# Patient Record
Sex: FEMALE | Race: WHITE | Employment: FULL TIME | URBAN - NONMETROPOLITAN AREA
[De-identification: names, ages, dates, MRNs, and addresses within clinical notes are randomized per-mention and may not be internally consistent; named-entity substitution may affect disease eponyms.]

---

## 2017-10-18 ENCOUNTER — HOSPITAL ENCOUNTER (OUTPATIENT)
Age: 32
Setting detail: SPECIMEN
Discharge: HOME | End: 2017-10-18
Payer: COMMERCIAL

## 2017-10-18 ENCOUNTER — OFFICE VISIT (OUTPATIENT)
Dept: OBGYN | Age: 32
End: 2017-10-18
Payer: COMMERCIAL

## 2017-10-18 VITALS
DIASTOLIC BLOOD PRESSURE: 64 MMHG | WEIGHT: 126 LBS | HEIGHT: 63 IN | BODY MASS INDEX: 22.32 KG/M2 | RESPIRATION RATE: 16 BRPM | HEART RATE: 76 BPM | SYSTOLIC BLOOD PRESSURE: 122 MMHG

## 2017-10-18 DIAGNOSIS — N94.10 DYSPAREUNIA IN FEMALE: ICD-10-CM

## 2017-10-18 DIAGNOSIS — Z01.419 WOMEN'S ANNUAL ROUTINE GYNECOLOGICAL EXAMINATION: ICD-10-CM

## 2017-10-18 DIAGNOSIS — N92.6 IRREGULAR BLEEDING: Primary | ICD-10-CM

## 2017-10-18 DIAGNOSIS — G44.029 CHRONIC CLUSTER HEADACHE, NOT INTRACTABLE: ICD-10-CM

## 2017-10-18 DIAGNOSIS — M54.89 BACK PAIN WITHOUT SCIATICA: ICD-10-CM

## 2017-10-18 DIAGNOSIS — M26.623 BILATERAL TEMPOROMANDIBULAR JOINT PAIN: ICD-10-CM

## 2017-10-18 DIAGNOSIS — N92.6 IRREGULAR BLEEDING: ICD-10-CM

## 2017-10-18 PROCEDURE — 87491 CHLMYD TRACH DNA AMP PROBE: CPT

## 2017-10-18 PROCEDURE — 1036F TOBACCO NON-USER: CPT | Performed by: OBSTETRICS & GYNECOLOGY

## 2017-10-18 PROCEDURE — G8484 FLU IMMUNIZE NO ADMIN: HCPCS | Performed by: OBSTETRICS & GYNECOLOGY

## 2017-10-18 PROCEDURE — G8427 DOCREV CUR MEDS BY ELIG CLIN: HCPCS | Performed by: OBSTETRICS & GYNECOLOGY

## 2017-10-18 PROCEDURE — 88175 CYTOPATH C/V AUTO FLUID REDO: CPT

## 2017-10-18 PROCEDURE — 99204 OFFICE O/P NEW MOD 45 MIN: CPT | Performed by: OBSTETRICS & GYNECOLOGY

## 2017-10-18 PROCEDURE — G8420 CALC BMI NORM PARAMETERS: HCPCS | Performed by: OBSTETRICS & GYNECOLOGY

## 2017-10-18 PROCEDURE — 87591 N.GONORRHOEAE DNA AMP PROB: CPT

## 2017-10-18 NOTE — PROGRESS NOTES
Subjective:   ANXIETY  C/O DAILY HEADACHES HA TAKING EXCEDRIN DAILY  OCCASIONAL INTRACTABLE WITH NAUSEA AND VOMITING    WORRIED THAT HER PROLACTINOMA HAS COME BACK  NO VISUAL CHANGES    CRIED AS SHE TELL HOW SHE LOST HER JOB ---  C/O BACK PAIN  DESPITE SEEING A CHIROPRACTOR    HERE TO SEE ME FOR CONSTANT BLEEDING FOR > 30 DAYS  LIGHT TO HEAVY WITH NO CLOTS   SEVERE CRAMPS AND PAIN    NOT PLEASED WITH INTERCOURSE  PAIN FULL SHE ONLY TRIES TO TOLERATE THE PAIN WITH INCREASED WITH DEEP PENTRATION       vIy Saucedo is a 34 y.o. female who presents for an annual exam. The patient has no complaints today. The patient is sexually active. Wears seatbelts: yes last pap: was normal  Regular exercise: not asked  Ever been transfused or tattooed?: not asked  The patient reports that domestic violence in her life is absent. Menstrual History:  OB History      Para Term  AB Living    5 2 2   3 2    SAB TAB Ectopic Molar Multiple Live Births                        Menarche age: 14  Patient's last menstrual period was 2017.        Past Medical History:   Diagnosis Date    Anxiety     Breast pain in female 2014    Pituitary adenoma ([facility] [address]     follows with Dr. Kuldeep Mcduffie     Patient Active Problem List    Diagnosis Date Noted    Breast pain in female 2014     Priority: Low     Class: Acute    Anxiety      Past Surgical History:   Procedure Laterality Date     SECTION  2009     SECTION  2011    CHOLECYSTECTOMY  2010    WISDOM TOOTH EXTRACTION  2007     Family History   Problem Relation Age of Onset    Cancer Mother      cervical    Heart Disease Father     Cancer Maternal Grandfather      lung     Social History     Social History    Marital status:      Spouse name: N/A    Number of children: N/A    Years of education: N/A     Social History Main Topics    Smoking status: Never Smoker    Smokeless tobacco: Never Used    Alcohol use 0.0 oz/week

## 2017-10-20 LAB
CHLAMYDIA BY THIN PREP: NEGATIVE
N. GONORRHOEAE DNA, THIN PREP: NEGATIVE

## 2017-10-26 ENCOUNTER — HOSPITAL ENCOUNTER (OUTPATIENT)
Dept: ULTRASOUND IMAGING | Age: 32
Discharge: HOME | End: 2017-10-26
Payer: COMMERCIAL

## 2017-10-26 ENCOUNTER — HOSPITAL ENCOUNTER (OUTPATIENT)
Dept: LAB | Age: 32
Setting detail: SPECIMEN
Discharge: HOME | End: 2017-10-26
Payer: COMMERCIAL

## 2017-10-26 DIAGNOSIS — N94.10 DYSPAREUNIA IN FEMALE: ICD-10-CM

## 2017-10-26 DIAGNOSIS — N92.6 IRREGULAR BLEEDING: ICD-10-CM

## 2017-10-26 LAB
ABSOLUTE EOS #: 0.1 K/UL (ref 0–0.4)
ABSOLUTE IMMATURE GRANULOCYTE: NORMAL K/UL (ref 0–0.3)
ABSOLUTE LYMPH #: 2.5 K/UL (ref 1–4.8)
ABSOLUTE MONO #: 0.6 K/UL (ref 0.1–1.2)
BASOPHILS # BLD: 1 % (ref 0–1)
BASOPHILS ABSOLUTE: 0 K/UL (ref 0–0.2)
C-REACTIVE PROTEIN: 2.4 MG/L (ref 0–5)
CYTOLOGY REPORT: NORMAL
DIFFERENTIAL TYPE: NORMAL
EOSINOPHILS RELATIVE PERCENT: 1 % (ref 1–7)
HCT VFR BLD CALC: 39.5 % (ref 36–46)
HEMOGLOBIN: 13.9 G/DL (ref 12–16)
IMMATURE GRANULOCYTES: NORMAL %
LYMPHOCYTES # BLD: 25 % (ref 16–46)
MCH RBC QN AUTO: 32 PG (ref 26–34)
MCHC RBC AUTO-ENTMCNC: 35.1 G/DL (ref 31–37)
MCV RBC AUTO: 91.3 FL (ref 80–100)
MONOCYTES # BLD: 6 % (ref 4–11)
PDW BLD-RTO: 12.6 % (ref 11–14.5)
PLATELET # BLD: 243 K/UL (ref 140–450)
PLATELET ESTIMATE: NORMAL
PMV BLD AUTO: 8.6 FL (ref 6–12)
RBC # BLD: 4.33 M/UL (ref 4–5.2)
RBC # BLD: NORMAL 10*6/UL
SEG NEUTROPHILS: 67 % (ref 43–77)
SEGMENTED NEUTROPHILS ABSOLUTE COUNT: 7 K/UL (ref 1.8–7.7)
TSH SERPL DL<=0.05 MIU/L-ACNC: 3.22 MIU/L (ref 0.3–5)
WBC # BLD: 10.4 K/UL (ref 3.5–11)
WBC # BLD: NORMAL 10*3/UL

## 2017-10-26 PROCEDURE — 86140 C-REACTIVE PROTEIN: CPT

## 2017-10-26 PROCEDURE — 84443 ASSAY THYROID STIM HORMONE: CPT

## 2017-10-26 PROCEDURE — 85025 COMPLETE CBC W/AUTO DIFF WBC: CPT

## 2017-10-26 PROCEDURE — 36415 COLL VENOUS BLD VENIPUNCTURE: CPT

## 2017-10-26 PROCEDURE — 76830 TRANSVAGINAL US NON-OB: CPT

## 2017-10-26 PROCEDURE — 76856 US EXAM PELVIC COMPLETE: CPT

## 2017-12-12 ENCOUNTER — NURSE ONLY (OUTPATIENT)
Dept: LAB | Age: 32
End: 2017-12-12
Payer: COMMERCIAL

## 2017-12-12 DIAGNOSIS — Z23 NEED FOR VACCINATION: Primary | ICD-10-CM

## 2017-12-12 PROCEDURE — 90686 IIV4 VACC NO PRSV 0.5 ML IM: CPT | Performed by: NURSE PRACTITIONER

## 2017-12-12 PROCEDURE — 90471 IMMUNIZATION ADMIN: CPT | Performed by: NURSE PRACTITIONER

## 2017-12-12 NOTE — PROGRESS NOTES
Have you had an allergic reaction to the flu (influenza) shot? no  Are you allergic to eggs or any component of the flu vaccine? no  Do you have a history of Guillain-Leonard Syndrome (GBS), which is paralysis after receiving the flu vaccine? no  Are you feeling well today? yes  Flu vaccine given as ordered. Patient tolerated it well. No questions re: VIS information.

## 2018-09-18 ENCOUNTER — OFFICE VISIT (OUTPATIENT)
Dept: FAMILY MEDICINE CLINIC | Age: 33
End: 2018-09-18
Payer: COMMERCIAL

## 2018-09-18 VITALS
BODY MASS INDEX: 21.62 KG/M2 | HEART RATE: 54 BPM | SYSTOLIC BLOOD PRESSURE: 100 MMHG | HEIGHT: 63 IN | OXYGEN SATURATION: 99 % | DIASTOLIC BLOOD PRESSURE: 62 MMHG | WEIGHT: 122 LBS

## 2018-09-18 DIAGNOSIS — F32.A DEPRESSION, UNSPECIFIED DEPRESSION TYPE: ICD-10-CM

## 2018-09-18 DIAGNOSIS — G47.00 INSOMNIA, UNSPECIFIED TYPE: ICD-10-CM

## 2018-09-18 DIAGNOSIS — F41.9 ANXIETY: Primary | ICD-10-CM

## 2018-09-18 PROCEDURE — 99214 OFFICE O/P EST MOD 30 MIN: CPT | Performed by: NURSE PRACTITIONER

## 2018-09-18 PROCEDURE — G8420 CALC BMI NORM PARAMETERS: HCPCS | Performed by: NURSE PRACTITIONER

## 2018-09-18 PROCEDURE — G8427 DOCREV CUR MEDS BY ELIG CLIN: HCPCS | Performed by: NURSE PRACTITIONER

## 2018-09-18 PROCEDURE — 1036F TOBACCO NON-USER: CPT | Performed by: NURSE PRACTITIONER

## 2018-09-18 RX ORDER — PAROXETINE HYDROCHLORIDE 20 MG/1
TABLET, FILM COATED ORAL
Qty: 30 TABLET | Refills: 1 | Status: SHIPPED | OUTPATIENT
Start: 2018-09-18 | End: 2018-11-06

## 2018-09-18 RX ORDER — TRAZODONE HYDROCHLORIDE 100 MG/1
TABLET ORAL
Qty: 30 TABLET | Refills: 0 | Status: SHIPPED | OUTPATIENT
Start: 2018-09-18 | End: 2018-11-06 | Stop reason: DRUGHIGH

## 2018-09-18 RX ORDER — ACETAMINOPHEN, ASPIRIN AND CAFFEINE 250; 250; 65 MG/1; MG/1; MG/1
1 TABLET, FILM COATED ORAL DAILY
COMMUNITY
Start: 2017-01-01

## 2018-09-18 ASSESSMENT — ENCOUNTER SYMPTOMS
EYES NEGATIVE: 1
COUGH: 0
SHORTNESS OF BREATH: 0
CONSTIPATION: 0
SINUS PRESSURE: 0
NAUSEA: 0
ALLERGIC/IMMUNOLOGIC NEGATIVE: 1
DIARRHEA: 0
CHEST TIGHTNESS: 0
TROUBLE SWALLOWING: 0
VOMITING: 0
GASTROINTESTINAL NEGATIVE: 1
ABDOMINAL PAIN: 0

## 2018-09-18 ASSESSMENT — PATIENT HEALTH QUESTIONNAIRE - PHQ9
SUM OF ALL RESPONSES TO PHQ QUESTIONS 1-9: 0
SUM OF ALL RESPONSES TO PHQ QUESTIONS 1-9: 0
SUM OF ALL RESPONSES TO PHQ9 QUESTIONS 1 & 2: 0
2. FEELING DOWN, DEPRESSED OR HOPELESS: 0
1. LITTLE INTEREST OR PLEASURE IN DOING THINGS: 0

## 2018-09-18 NOTE — PROGRESS NOTES
[facility]    Subjective:      Patient ID: Jamie Tinajero is a 28 y.o. y.o. female. HPI Patient in for office for anxiety and insomnia. Anxiety- She states that this has been ongoing for years. She used to take Zoloft and Buspar and Celexa in the past without effect. [facility] she saw a psychiatrist and counselor in the past. She has more increased irritability and agitation. She is quick to temper at the kids. She is tearful today. She states that she takes benadryl for sleep and this used to help her but now it is not helping. Past Medical History:   Diagnosis Date    Anxiety     Breast pain in female 2014    Pituitary adenoma ([facility] [address]     follows with Dr. Cinthia Garcia       Past Surgical History:   Procedure Laterality Date     SECTION  2009     SECTION  2011    CHOLECYSTECTOMY  2010    WISDOM TOOTH EXTRACTION  2007       Family History   Problem Relation Age of Onset    Cancer Mother         cervical    Heart Disease Father     Cancer Maternal Grandfather         lung       No Known Allergies    Current Outpatient Prescriptions   Medication Sig Dispense Refill    aspirin-acetaminophen-caffeine (EXCEDRIN MIGRAINE) 250-250-65 MG per tablet Take 1 tablet by mouth daily      traZODone (DESYREL) 100 MG tablet Take a half a tablet at night up to a full tablet. 30 tablet 0    PARoxetine (PAXIL) 20 MG tablet Take a half a tablet daily for 7 days and then full tablet daily. 30 tablet 1    Levonorgestrel (MIRENA IU) by Intrauterine route. No current facility-administered medications for this visit. Review of Systems   Constitutional: Negative for activity change, appetite change and fever. HENT: Negative. Negative for congestion, ear pain, sinus pressure and trouble swallowing. Eyes: Negative. Respiratory: Negative for cough, chest tightness and shortness of breath. Cardiovascular: Negative.   Negative for chest pain and palpitations. Gastrointestinal: Negative. Negative for abdominal pain, constipation, diarrhea, nausea and vomiting. Endocrine: Negative. Genitourinary: Negative for difficulty urinating and dysuria. Musculoskeletal: Negative. Skin: Negative. Allergic/Immunologic: Negative. Neurological: Negative for dizziness, light-headedness and headaches. Hematological: Negative. Psychiatric/Behavioral: Positive for sleep disturbance (difficulty getting sopme sleep and stayting asleep). Negative for self-injury and suicidal ideas. The patient is nervous/anxious (tearful). Objective:      /62   Pulse 54   Ht 5' 3\" (1.6 m)   Wt 122 lb (55.3 kg)   SpO2 99%   BMI 21.61 kg/m²     Physical Exam   Constitutional: She is oriented to person, place, and time. She appears well-developed and well-nourished. HENT:   Head: Normocephalic and atraumatic. Right Ear: External ear normal.   Left Ear: External ear normal.   Nose: Nose normal.   Eyes: Pupils are equal, round, and reactive to light. Conjunctivae and EOM are normal.   Neck: Normal range of motion. Cardiovascular: Normal rate, regular rhythm and normal heart sounds. Pulmonary/Chest: Effort normal and breath sounds normal.   Abdominal: Soft. Musculoskeletal: Normal range of motion. Neurological: She is alert and oriented to person, place, and time. Skin: Skin is warm and dry. Psychiatric: Her speech is normal and behavior is normal. Judgment and thought content normal. Her mood appears anxious. Cognition and memory are normal. She exhibits a depressed mood. Assessment & Plan:      1. Anxiety-chronic recurrent    - traZODone (DESYREL) 100 MG tablet; Take a half a tablet at night up to a full tablet. Dispense: 30 tablet; Refill: 0  - PARoxetine (PAXIL) 20 MG tablet; Take a half a tablet daily for 7 days and then full tablet daily. Dispense: 30 tablet; Refill: 1    2.  Insomnia, unspecified type-chronic

## 2018-10-22 DIAGNOSIS — F41.9 ANXIETY: ICD-10-CM

## 2018-10-22 DIAGNOSIS — G47.00 INSOMNIA, UNSPECIFIED TYPE: ICD-10-CM

## 2018-10-22 DIAGNOSIS — F32.A DEPRESSION, UNSPECIFIED DEPRESSION TYPE: ICD-10-CM

## 2018-10-22 RX ORDER — TRAZODONE HYDROCHLORIDE 100 MG/1
TABLET ORAL
Qty: 30 TABLET | Refills: 0 | OUTPATIENT
Start: 2018-10-22

## 2018-10-22 RX ORDER — PAROXETINE HYDROCHLORIDE 20 MG/1
TABLET, FILM COATED ORAL
Qty: 30 TABLET | Refills: 1 | OUTPATIENT
Start: 2018-10-22

## 2018-10-23 ENCOUNTER — NURSE ONLY (OUTPATIENT)
Dept: PEDIATRICS | Age: 33
End: 2018-10-23
Payer: COMMERCIAL

## 2018-10-23 DIAGNOSIS — Z23 NEED FOR INFLUENZA VACCINATION: Primary | ICD-10-CM

## 2018-10-23 PROCEDURE — 90686 IIV4 VACC NO PRSV 0.5 ML IM: CPT | Performed by: FAMILY MEDICINE

## 2018-10-23 PROCEDURE — 90471 IMMUNIZATION ADMIN: CPT | Performed by: FAMILY MEDICINE

## 2018-11-06 ENCOUNTER — OFFICE VISIT (OUTPATIENT)
Dept: FAMILY MEDICINE CLINIC | Age: 33
End: 2018-11-06
Payer: COMMERCIAL

## 2018-11-06 VITALS
DIASTOLIC BLOOD PRESSURE: 60 MMHG | RESPIRATION RATE: 16 BRPM | WEIGHT: 124 LBS | HEIGHT: 63 IN | HEART RATE: 62 BPM | BODY MASS INDEX: 21.97 KG/M2 | SYSTOLIC BLOOD PRESSURE: 100 MMHG | OXYGEN SATURATION: 99 %

## 2018-11-06 DIAGNOSIS — F41.9 ANXIETY: Primary | ICD-10-CM

## 2018-11-06 DIAGNOSIS — F51.01 PRIMARY INSOMNIA: ICD-10-CM

## 2018-11-06 PROCEDURE — 99214 OFFICE O/P EST MOD 30 MIN: CPT | Performed by: NURSE PRACTITIONER

## 2018-11-06 PROCEDURE — G8420 CALC BMI NORM PARAMETERS: HCPCS | Performed by: NURSE PRACTITIONER

## 2018-11-06 PROCEDURE — 1036F TOBACCO NON-USER: CPT | Performed by: NURSE PRACTITIONER

## 2018-11-06 PROCEDURE — G8427 DOCREV CUR MEDS BY ELIG CLIN: HCPCS | Performed by: NURSE PRACTITIONER

## 2018-11-06 PROCEDURE — G8482 FLU IMMUNIZE ORDER/ADMIN: HCPCS | Performed by: NURSE PRACTITIONER

## 2018-11-06 RX ORDER — BUPROPION HYDROCHLORIDE 150 MG/1
150 TABLET ORAL EVERY MORNING
Qty: 7 TABLET | Refills: 0 | Status: SHIPPED | OUTPATIENT
Start: 2018-11-06 | End: 2019-01-11

## 2018-11-06 RX ORDER — TRAZODONE HYDROCHLORIDE 300 MG/1
300 TABLET ORAL NIGHTLY PRN
Qty: 30 TABLET | Refills: 3 | Status: SHIPPED | OUTPATIENT
Start: 2018-11-06 | End: 2018-11-15 | Stop reason: SDUPTHER

## 2018-11-06 RX ORDER — ALPRAZOLAM 0.25 MG/1
0.25 TABLET ORAL 3 TIMES DAILY PRN
Qty: 30 TABLET | Refills: 0 | Status: SHIPPED | OUTPATIENT
Start: 2018-11-06 | End: 2018-12-06

## 2018-11-06 RX ORDER — BUPROPION HYDROCHLORIDE 300 MG/1
300 TABLET ORAL EVERY MORNING
Qty: 30 TABLET | Refills: 3 | Status: SHIPPED | OUTPATIENT
Start: 2018-11-14 | End: 2019-01-11

## 2018-11-06 ASSESSMENT — ENCOUNTER SYMPTOMS
ALLERGIC/IMMUNOLOGIC NEGATIVE: 1
RESPIRATORY NEGATIVE: 1
ABDOMINAL PAIN: 0
SHORTNESS OF BREATH: 0
SINUS PRESSURE: 0
COUGH: 0
EYES NEGATIVE: 1
DIARRHEA: 0
CONSTIPATION: 0
VOMITING: 0
NAUSEA: 0
TROUBLE SWALLOWING: 0
CHEST TIGHTNESS: 0

## 2018-11-06 ASSESSMENT — PATIENT HEALTH QUESTIONNAIRE - PHQ9
SUM OF ALL RESPONSES TO PHQ QUESTIONS 1-9: 2
2. FEELING DOWN, DEPRESSED OR HOPELESS: 1
SUM OF ALL RESPONSES TO PHQ9 QUESTIONS 1 & 2: 2
SUM OF ALL RESPONSES TO PHQ QUESTIONS 1-9: 2
1. LITTLE INTEREST OR PLEASURE IN DOING THINGS: 1

## 2018-11-06 NOTE — PROGRESS NOTES
Review of Systems   Constitutional: Negative for activity change, appetite change and fever. HENT: Negative. Negative for congestion, ear pain, sinus pressure and trouble swallowing. Eyes: Negative. Respiratory: Negative. Negative for cough, chest tightness and shortness of breath. Cardiovascular: Negative for chest pain and palpitations. Gastrointestinal: Negative for abdominal pain, constipation, diarrhea, nausea and vomiting. Endocrine: Negative. Genitourinary: Negative for difficulty urinating and dysuria. Musculoskeletal: Negative. Skin: Negative. Allergic/Immunologic: Negative. Neurological: Negative for dizziness, light-headedness and headaches. Hematological: Negative. Psychiatric/Behavioral: Positive for agitation and sleep disturbance (improving but not to patinet goals). The patient is nervous/anxious. Objective:      /60   Pulse 62   Resp 16   Ht 5' 3\" (1.6 m)   Wt 124 lb (56.2 kg)   SpO2 99%   BMI 21.97 kg/m²     Physical Exam   Constitutional: She is oriented to person, place, and time. She appears well-developed and well-nourished. HENT:   Head: Normocephalic and atraumatic. Right Ear: External ear normal.   Left Ear: External ear normal.   Nose: Nose normal.   Mouth/Throat: Oropharynx is clear and moist.   Eyes: Pupils are equal, round, and reactive to light. Conjunctivae and EOM are normal.   Neck: Normal range of motion. Neck supple. Cardiovascular: Normal rate, regular rhythm, normal heart sounds and intact distal pulses. Pulmonary/Chest: Effort normal and breath sounds normal.   Abdominal: Soft. Bowel sounds are normal.   Musculoskeletal: Normal range of motion. Neurological: She is alert and oriented to person, place, and time. Skin: Skin is warm and dry. Psychiatric: She has a normal mood and affect. Her behavior is normal. Judgment and thought content normal.         Assessment & Plan:      1.  Anxiety-chronic

## 2018-11-12 ENCOUNTER — PATIENT MESSAGE (OUTPATIENT)
Dept: FAMILY MEDICINE CLINIC | Age: 33
End: 2018-11-12

## 2018-11-12 DIAGNOSIS — F51.01 PRIMARY INSOMNIA: ICD-10-CM

## 2018-11-14 RX ORDER — TRAZODONE HYDROCHLORIDE 300 MG/1
300 TABLET ORAL NIGHTLY PRN
Status: CANCELLED | OUTPATIENT
Start: 2018-11-14

## 2018-11-15 RX ORDER — TRAZODONE HYDROCHLORIDE 150 MG/1
300 TABLET ORAL NIGHTLY PRN
Qty: 60 TABLET | Refills: 3 | Status: SHIPPED | OUTPATIENT
Start: 2018-11-15 | End: 2019-09-19

## 2019-01-04 ENCOUNTER — HOSPITAL ENCOUNTER (OUTPATIENT)
Dept: LAB | Age: 34
Discharge: HOME | End: 2019-01-04
Payer: COMMERCIAL

## 2019-01-04 LAB
ABSOLUTE EOS #: 0.1 K/UL (ref 0–0.4)
ABSOLUTE IMMATURE GRANULOCYTE: NORMAL K/UL (ref 0–0.3)
ABSOLUTE LYMPH #: 2.2 K/UL (ref 1–4.8)
ABSOLUTE MONO #: 0.5 K/UL (ref 0.1–1.2)
ALBUMIN SERPL-MCNC: 4.5 G/DL (ref 3.5–5.2)
ALBUMIN/GLOBULIN RATIO: 1.6 (ref 1–2.5)
ALP BLD-CCNC: 61 U/L (ref 35–104)
ALT SERPL-CCNC: 11 U/L (ref 5–33)
AMPHETAMINE SCREEN URINE: NEGATIVE
ANION GAP SERPL CALCULATED.3IONS-SCNC: 11 MMOL/L (ref 9–17)
AST SERPL-CCNC: 14 U/L
BARBITURATE SCREEN URINE: NEGATIVE
BASOPHILS # BLD: 1 % (ref 0–1)
BASOPHILS ABSOLUTE: 0.1 K/UL (ref 0–0.2)
BENZODIAZEPINE SCREEN, URINE: POSITIVE
BILIRUB SERPL-MCNC: 0.44 MG/DL (ref 0.3–1.2)
BUN BLDV-MCNC: 11 MG/DL (ref 6–20)
BUN/CREAT BLD: 14 (ref 9–20)
BUPRENORPHINE URINE: NEGATIVE
CALCIUM SERPL-MCNC: 9.5 MG/DL (ref 8.6–10.4)
CANNABINOID SCREEN URINE: NEGATIVE
CHLORIDE BLD-SCNC: 101 MMOL/L (ref 98–107)
CHOLESTEROL/HDL RATIO: 1.8
CHOLESTEROL: 143 MG/DL
CO2: 27 MMOL/L (ref 20–31)
COCAINE METABOLITE, URINE: NEGATIVE
CREAT SERPL-MCNC: 0.78 MG/DL (ref 0.5–0.9)
DIFFERENTIAL TYPE: NORMAL
EOSINOPHILS RELATIVE PERCENT: 1 % (ref 1–7)
ESTIMATED AVERAGE GLUCOSE: 88 MG/DL
GFR AFRICAN AMERICAN: >60 ML/MIN
GFR NON-AFRICAN AMERICAN: >60 ML/MIN
GFR SERPL CREATININE-BSD FRML MDRD: NORMAL ML/MIN/{1.73_M2}
GFR SERPL CREATININE-BSD FRML MDRD: NORMAL ML/MIN/{1.73_M2}
GLUCOSE BLD-MCNC: 85 MG/DL (ref 70–99)
HBA1C MFR BLD: 4.7 % (ref 4.8–5.9)
HCT VFR BLD CALC: 39.9 % (ref 36–46)
HDLC SERPL-MCNC: 79 MG/DL
HEMOGLOBIN: 13.5 G/DL (ref 12–16)
IMMATURE GRANULOCYTES: NORMAL %
LDL CHOLESTEROL: 56 MG/DL (ref 0–130)
LYMPHOCYTES # BLD: 32 % (ref 16–46)
MAGNESIUM: 2 MG/DL (ref 1.6–2.6)
MCH RBC QN AUTO: 31 PG (ref 26–34)
MCHC RBC AUTO-ENTMCNC: 33.8 G/DL (ref 31–37)
MCV RBC AUTO: 92 FL (ref 80–100)
MDMA URINE: ABNORMAL
METHADONE SCREEN, URINE: NEGATIVE
METHAMPHETAMINE, URINE: NEGATIVE
MONOCYTES # BLD: 7 % (ref 4–11)
NRBC AUTOMATED: NORMAL PER 100 WBC
OPIATES, URINE: NEGATIVE
OXYCODONE SCREEN URINE: NEGATIVE
PDW BLD-RTO: 12.3 % (ref 11–14.5)
PHENCYCLIDINE, URINE: NEGATIVE
PLATELET # BLD: 237 K/UL (ref 140–450)
PLATELET ESTIMATE: NORMAL
PMV BLD AUTO: 8.3 FL (ref 6–12)
POTASSIUM SERPL-SCNC: 3.9 MMOL/L (ref 3.7–5.3)
PROPOXYPHENE, URINE: NEGATIVE
RBC # BLD: 4.33 M/UL (ref 4–5.2)
RBC # BLD: NORMAL 10*6/UL
SEG NEUTROPHILS: 59 % (ref 43–77)
SEGMENTED NEUTROPHILS ABSOLUTE COUNT: 3.9 K/UL (ref 1.8–7.7)
SODIUM BLD-SCNC: 139 MMOL/L (ref 135–144)
TEST INFORMATION: ABNORMAL
THYROXINE, FREE: 0.99 NG/DL (ref 0.93–1.7)
TOTAL PROTEIN: 7.3 G/DL (ref 6.4–8.3)
TRICYCLIC ANTIDEPRESSANTS, UR: NEGATIVE
TRIGL SERPL-MCNC: 41 MG/DL
TSH SERPL DL<=0.05 MIU/L-ACNC: 8.12 MIU/L (ref 0.3–5)
VITAMIN D 25-HYDROXY: 50.6 NG/ML (ref 30–100)
VLDLC SERPL CALC-MCNC: NORMAL MG/DL (ref 1–30)
WBC # BLD: 6.7 K/UL (ref 3.5–11)
WBC # BLD: NORMAL 10*3/UL

## 2019-01-04 PROCEDURE — 83036 HEMOGLOBIN GLYCOSYLATED A1C: CPT

## 2019-01-04 PROCEDURE — 80061 LIPID PANEL: CPT

## 2019-01-04 PROCEDURE — 82306 VITAMIN D 25 HYDROXY: CPT

## 2019-01-04 PROCEDURE — 83735 ASSAY OF MAGNESIUM: CPT

## 2019-01-04 PROCEDURE — 36415 COLL VENOUS BLD VENIPUNCTURE: CPT

## 2019-01-04 PROCEDURE — 80053 COMPREHEN METABOLIC PANEL: CPT

## 2019-01-04 PROCEDURE — 84439 ASSAY OF FREE THYROXINE: CPT

## 2019-01-04 PROCEDURE — 85025 COMPLETE CBC W/AUTO DIFF WBC: CPT

## 2019-01-04 PROCEDURE — 80306 DRUG TEST PRSMV INSTRMNT: CPT

## 2019-01-04 PROCEDURE — 84443 ASSAY THYROID STIM HORMONE: CPT

## 2019-01-11 ENCOUNTER — OFFICE VISIT (OUTPATIENT)
Dept: FAMILY MEDICINE CLINIC | Age: 34
End: 2019-01-11
Payer: COMMERCIAL

## 2019-01-11 VITALS
WEIGHT: 122.2 LBS | DIASTOLIC BLOOD PRESSURE: 74 MMHG | OXYGEN SATURATION: 100 % | HEIGHT: 63 IN | BODY MASS INDEX: 21.65 KG/M2 | RESPIRATION RATE: 12 BRPM | HEART RATE: 68 BPM | SYSTOLIC BLOOD PRESSURE: 122 MMHG | TEMPERATURE: 99.3 F

## 2019-01-11 DIAGNOSIS — E03.9 HYPOTHYROIDISM, UNSPECIFIED TYPE: Primary | ICD-10-CM

## 2019-01-11 DIAGNOSIS — R13.13 PHARYNGEAL DYSPHAGIA: ICD-10-CM

## 2019-01-11 PROCEDURE — G8482 FLU IMMUNIZE ORDER/ADMIN: HCPCS | Performed by: NURSE PRACTITIONER

## 2019-01-11 PROCEDURE — G8427 DOCREV CUR MEDS BY ELIG CLIN: HCPCS | Performed by: NURSE PRACTITIONER

## 2019-01-11 PROCEDURE — 99213 OFFICE O/P EST LOW 20 MIN: CPT | Performed by: NURSE PRACTITIONER

## 2019-01-11 PROCEDURE — G8420 CALC BMI NORM PARAMETERS: HCPCS | Performed by: NURSE PRACTITIONER

## 2019-01-11 PROCEDURE — 1036F TOBACCO NON-USER: CPT | Performed by: NURSE PRACTITIONER

## 2019-01-11 RX ORDER — LAMOTRIGINE 100 MG/1
150 TABLET ORAL
COMMUNITY
Start: 2019-01-07 | End: 2019-02-26

## 2019-01-11 RX ORDER — CLONAZEPAM 0.5 MG/1
TABLET ORAL
Refills: 0 | COMMUNITY
Start: 2019-01-08 | End: 2021-08-28 | Stop reason: ALTCHOICE

## 2019-01-11 RX ORDER — LEVOTHYROXINE SODIUM 0.03 MG/1
25 TABLET ORAL DAILY
Qty: 30 TABLET | Refills: 1 | Status: SHIPPED | OUTPATIENT
Start: 2019-01-11 | End: 2019-03-06 | Stop reason: SDUPTHER

## 2019-01-11 RX ORDER — ZIPRASIDONE HYDROCHLORIDE 80 MG/1
80 CAPSULE ORAL DAILY
COMMUNITY
Start: 2019-01-07 | End: 2019-09-19

## 2019-01-11 ASSESSMENT — PATIENT HEALTH QUESTIONNAIRE - PHQ9
SUM OF ALL RESPONSES TO PHQ QUESTIONS 1-9: 0
1. LITTLE INTEREST OR PLEASURE IN DOING THINGS: 0
SUM OF ALL RESPONSES TO PHQ QUESTIONS 1-9: 0
2. FEELING DOWN, DEPRESSED OR HOPELESS: 0
SUM OF ALL RESPONSES TO PHQ9 QUESTIONS 1 & 2: 0

## 2019-01-11 ASSESSMENT — ENCOUNTER SYMPTOMS
DIARRHEA: 0
ABDOMINAL PAIN: 0
CONSTIPATION: 0
VOMITING: 0
RESPIRATORY NEGATIVE: 1
NAUSEA: 0
SHORTNESS OF BREATH: 0
CHEST TIGHTNESS: 0
SINUS PRESSURE: 0
TROUBLE SWALLOWING: 1
COLOR CHANGE: 0
ALLERGIC/IMMUNOLOGIC NEGATIVE: 1
EYES NEGATIVE: 1
COUGH: 0
ROS SKIN COMMENTS: DRY SKIN

## 2019-02-22 ENCOUNTER — HOSPITAL ENCOUNTER (OUTPATIENT)
Dept: LAB | Age: 34
Discharge: HOME | End: 2019-02-22
Payer: COMMERCIAL

## 2019-02-22 DIAGNOSIS — E03.9 HYPOTHYROIDISM, UNSPECIFIED TYPE: ICD-10-CM

## 2019-02-22 LAB
THYROXINE, FREE: 1.15 NG/DL (ref 0.93–1.7)
TSH SERPL DL<=0.05 MIU/L-ACNC: 4.79 MIU/L (ref 0.3–5)

## 2019-02-22 PROCEDURE — 84443 ASSAY THYROID STIM HORMONE: CPT

## 2019-02-22 PROCEDURE — 36415 COLL VENOUS BLD VENIPUNCTURE: CPT

## 2019-02-22 PROCEDURE — 84439 ASSAY OF FREE THYROXINE: CPT

## 2019-02-26 ENCOUNTER — OFFICE VISIT (OUTPATIENT)
Dept: FAMILY MEDICINE CLINIC | Age: 34
End: 2019-02-26
Payer: COMMERCIAL

## 2019-02-26 ENCOUNTER — NURSE ONLY (OUTPATIENT)
Dept: LAB | Age: 34
End: 2019-02-26
Payer: COMMERCIAL

## 2019-02-26 VITALS
WEIGHT: 119.6 LBS | SYSTOLIC BLOOD PRESSURE: 100 MMHG | DIASTOLIC BLOOD PRESSURE: 62 MMHG | BODY MASS INDEX: 21.19 KG/M2 | RESPIRATION RATE: 12 BRPM | HEIGHT: 63 IN | TEMPERATURE: 98.3 F | HEART RATE: 65 BPM | OXYGEN SATURATION: 98 %

## 2019-02-26 DIAGNOSIS — Z23 NEED FOR VACCINATION: Primary | ICD-10-CM

## 2019-02-26 DIAGNOSIS — R13.10 DYSPHAGIA, UNSPECIFIED TYPE: Primary | ICD-10-CM

## 2019-02-26 DIAGNOSIS — F41.9 ANXIETY: ICD-10-CM

## 2019-02-26 DIAGNOSIS — Z23 NEED FOR TDAP VACCINATION: ICD-10-CM

## 2019-02-26 DIAGNOSIS — F51.01 PRIMARY INSOMNIA: ICD-10-CM

## 2019-02-26 PROCEDURE — 1036F TOBACCO NON-USER: CPT | Performed by: NURSE PRACTITIONER

## 2019-02-26 PROCEDURE — G8420 CALC BMI NORM PARAMETERS: HCPCS | Performed by: NURSE PRACTITIONER

## 2019-02-26 PROCEDURE — 90471 IMMUNIZATION ADMIN: CPT | Performed by: NURSE PRACTITIONER

## 2019-02-26 PROCEDURE — 99214 OFFICE O/P EST MOD 30 MIN: CPT | Performed by: NURSE PRACTITIONER

## 2019-02-26 PROCEDURE — G8427 DOCREV CUR MEDS BY ELIG CLIN: HCPCS | Performed by: NURSE PRACTITIONER

## 2019-02-26 PROCEDURE — G8482 FLU IMMUNIZE ORDER/ADMIN: HCPCS | Performed by: NURSE PRACTITIONER

## 2019-02-26 RX ORDER — ALPRAZOLAM 0.5 MG/1
0.5 TABLET ORAL NIGHTLY PRN
Qty: 30 TABLET | Refills: 0 | Status: SHIPPED | OUTPATIENT
Start: 2019-02-26 | End: 2019-03-28

## 2019-02-26 RX ORDER — LAMOTRIGINE 150 MG/1
200 TABLET ORAL DAILY
COMMUNITY
Start: 2019-02-05

## 2019-02-26 ASSESSMENT — ENCOUNTER SYMPTOMS
TROUBLE SWALLOWING: 1
GASTROINTESTINAL NEGATIVE: 1
RESPIRATORY NEGATIVE: 1

## 2019-02-26 ASSESSMENT — PATIENT HEALTH QUESTIONNAIRE - PHQ9
SUM OF ALL RESPONSES TO PHQ QUESTIONS 1-9: 0
SUM OF ALL RESPONSES TO PHQ9 QUESTIONS 1 & 2: 0
SUM OF ALL RESPONSES TO PHQ QUESTIONS 1-9: 0
1. LITTLE INTEREST OR PLEASURE IN DOING THINGS: 0
2. FEELING DOWN, DEPRESSED OR HOPELESS: 0

## 2019-02-27 ENCOUNTER — INITIAL CONSULT (OUTPATIENT)
Dept: SURGERY | Age: 34
End: 2019-02-27
Payer: COMMERCIAL

## 2019-02-27 VITALS
WEIGHT: 117.8 LBS | TEMPERATURE: 98.9 F | HEIGHT: 63 IN | BODY MASS INDEX: 20.87 KG/M2 | DIASTOLIC BLOOD PRESSURE: 62 MMHG | HEART RATE: 60 BPM | SYSTOLIC BLOOD PRESSURE: 100 MMHG

## 2019-02-27 DIAGNOSIS — R13.10 DYSPHAGIA, UNSPECIFIED TYPE: Primary | ICD-10-CM

## 2019-02-27 PROCEDURE — G8420 CALC BMI NORM PARAMETERS: HCPCS | Performed by: SURGERY

## 2019-02-27 PROCEDURE — 99203 OFFICE O/P NEW LOW 30 MIN: CPT | Performed by: SURGERY

## 2019-02-27 PROCEDURE — 1036F TOBACCO NON-USER: CPT | Performed by: SURGERY

## 2019-02-27 PROCEDURE — G8427 DOCREV CUR MEDS BY ELIG CLIN: HCPCS | Performed by: SURGERY

## 2019-02-27 PROCEDURE — G8482 FLU IMMUNIZE ORDER/ADMIN: HCPCS | Performed by: SURGERY

## 2019-03-06 DIAGNOSIS — E03.9 HYPOTHYROIDISM, UNSPECIFIED TYPE: ICD-10-CM

## 2019-03-07 ENCOUNTER — HOSPITAL ENCOUNTER (OUTPATIENT)
Dept: SPEECH THERAPY | Age: 34
Setting detail: THERAPIES SERIES
Discharge: HOME | End: 2019-03-07
Payer: COMMERCIAL

## 2019-03-07 ENCOUNTER — TELEPHONE (OUTPATIENT)
Dept: SURGERY | Age: 34
End: 2019-03-07

## 2019-03-07 ENCOUNTER — HOSPITAL ENCOUNTER (OUTPATIENT)
Dept: GENERAL RADIOLOGY | Age: 34
Discharge: HOME | End: 2019-03-09
Payer: COMMERCIAL

## 2019-03-07 DIAGNOSIS — R13.10 DYSPHAGIA, UNSPECIFIED TYPE: ICD-10-CM

## 2019-03-07 DIAGNOSIS — R13.10 DYSPHAGIA, UNSPECIFIED TYPE: Primary | ICD-10-CM

## 2019-03-07 PROCEDURE — 92611 MOTION FLUOROSCOPY/SWALLOW: CPT | Performed by: SPEECH-LANGUAGE PATHOLOGIST

## 2019-03-07 PROCEDURE — 74230 X-RAY XM SWLNG FUNCJ C+: CPT

## 2019-03-07 PROCEDURE — 2500000003 HC RX 250 WO HCPCS: Performed by: SURGERY

## 2019-03-07 RX ORDER — LEVOTHYROXINE SODIUM 0.03 MG/1
25 TABLET ORAL DAILY
Qty: 30 TABLET | Refills: 5 | Status: SHIPPED | OUTPATIENT
Start: 2019-03-07 | End: 2019-08-29 | Stop reason: SDUPTHER

## 2019-03-07 RX ADMIN — BARIUM SULFATE 140 ML: 980 POWDER, FOR SUSPENSION ORAL at 11:30

## 2019-03-07 NOTE — TELEPHONE ENCOUNTER
Patient states since there is no mass present in her throat, this is what she was told during her swallow study, patient does not wish to continue on with her EGD. She states since there is nothing there then she is not going to do any type of testing at this time. Her EGD was scheduled for March 13, 2019.

## 2019-04-06 DIAGNOSIS — E03.9 HYPOTHYROIDISM, UNSPECIFIED TYPE: ICD-10-CM

## 2019-04-08 RX ORDER — LEVOTHYROXINE SODIUM 0.03 MG/1
25 TABLET ORAL DAILY
Qty: 30 TABLET | Refills: 5 | OUTPATIENT
Start: 2019-04-08

## 2019-08-29 ENCOUNTER — PROCEDURE VISIT (OUTPATIENT)
Dept: OBGYN | Age: 34
End: 2019-08-29
Payer: COMMERCIAL

## 2019-08-29 ENCOUNTER — HOSPITAL ENCOUNTER (OUTPATIENT)
Age: 34
Setting detail: SPECIMEN
Discharge: HOME | End: 2019-08-29
Payer: COMMERCIAL

## 2019-08-29 ENCOUNTER — OFFICE VISIT (OUTPATIENT)
Dept: FAMILY MEDICINE CLINIC | Age: 34
End: 2019-08-29
Payer: COMMERCIAL

## 2019-08-29 VITALS
DIASTOLIC BLOOD PRESSURE: 60 MMHG | HEIGHT: 63 IN | WEIGHT: 105 LBS | BODY MASS INDEX: 18.61 KG/M2 | SYSTOLIC BLOOD PRESSURE: 98 MMHG

## 2019-08-29 VITALS
DIASTOLIC BLOOD PRESSURE: 72 MMHG | TEMPERATURE: 98 F | HEIGHT: 63 IN | HEART RATE: 68 BPM | BODY MASS INDEX: 19.88 KG/M2 | OXYGEN SATURATION: 100 % | SYSTOLIC BLOOD PRESSURE: 104 MMHG | RESPIRATION RATE: 12 BRPM | WEIGHT: 112.2 LBS

## 2019-08-29 DIAGNOSIS — Z12.4 PAP SMEAR FOR CERVICAL CANCER SCREENING: ICD-10-CM

## 2019-08-29 DIAGNOSIS — E03.9 HYPOTHYROIDISM, UNSPECIFIED TYPE: ICD-10-CM

## 2019-08-29 DIAGNOSIS — Z30.432 ENCOUNTER FOR IUD REMOVAL: Primary | ICD-10-CM

## 2019-08-29 DIAGNOSIS — M79.671 RIGHT FOOT PAIN: Primary | ICD-10-CM

## 2019-08-29 PROCEDURE — 99395 PREV VISIT EST AGE 18-39: CPT | Performed by: OBSTETRICS & GYNECOLOGY

## 2019-08-29 PROCEDURE — G8427 DOCREV CUR MEDS BY ELIG CLIN: HCPCS | Performed by: NURSE PRACTITIONER

## 2019-08-29 PROCEDURE — 1036F TOBACCO NON-USER: CPT | Performed by: NURSE PRACTITIONER

## 2019-08-29 PROCEDURE — 58301 REMOVE INTRAUTERINE DEVICE: CPT | Performed by: OBSTETRICS & GYNECOLOGY

## 2019-08-29 PROCEDURE — G8420 CALC BMI NORM PARAMETERS: HCPCS | Performed by: NURSE PRACTITIONER

## 2019-08-29 PROCEDURE — G0145 SCR C/V CYTO,THINLAYER,RESCR: HCPCS

## 2019-08-29 PROCEDURE — 99214 OFFICE O/P EST MOD 30 MIN: CPT | Performed by: NURSE PRACTITIONER

## 2019-08-29 RX ORDER — PREDNISONE 50 MG/1
50 TABLET ORAL DAILY
Qty: 5 TABLET | Refills: 0 | Status: SHIPPED | OUTPATIENT
Start: 2019-08-29 | End: 2019-09-03

## 2019-08-29 RX ORDER — LEVOTHYROXINE SODIUM 0.03 MG/1
25 TABLET ORAL DAILY
Qty: 30 TABLET | Refills: 5 | Status: SHIPPED | OUTPATIENT
Start: 2019-08-29 | End: 2020-03-02

## 2019-08-29 NOTE — PROGRESS NOTES
from the vagina and hemostasis was complete. Post procedure restrictions were reviewed and given to the patient. She was instructed to use barrier protection for sexually transmitted disease prevention.       Impression :  IUD Removal ,& getting pap smear    Plan:  IUD removed , and pap smear             Instruction reviewed as above

## 2019-09-11 ENCOUNTER — TELEPHONE (OUTPATIENT)
Dept: FAMILY MEDICINE CLINIC | Age: 34
End: 2019-09-11

## 2019-09-11 DIAGNOSIS — M79.671 RIGHT FOOT PAIN: Primary | ICD-10-CM

## 2019-09-13 LAB — CYTOLOGY REPORT: NORMAL

## 2019-09-14 ENCOUNTER — HOSPITAL ENCOUNTER (OUTPATIENT)
Dept: GENERAL RADIOLOGY | Age: 34
Discharge: HOME | End: 2019-09-16
Payer: COMMERCIAL

## 2019-09-14 DIAGNOSIS — M79.671 RIGHT FOOT PAIN: ICD-10-CM

## 2019-09-14 PROCEDURE — 73630 X-RAY EXAM OF FOOT: CPT

## 2019-09-16 ENCOUNTER — PATIENT MESSAGE (OUTPATIENT)
Dept: FAMILY MEDICINE CLINIC | Age: 34
End: 2019-09-16

## 2019-09-16 DIAGNOSIS — M79.671 RIGHT FOOT PAIN: Primary | ICD-10-CM

## 2019-09-16 DIAGNOSIS — F41.9 ANXIETY: ICD-10-CM

## 2019-09-16 RX ORDER — NYSTATIN 100000 U/G
CREAM TOPICAL
Qty: 1 TUBE | Refills: 0 | Status: SHIPPED | OUTPATIENT
Start: 2019-09-16 | End: 2020-03-05 | Stop reason: SDUPTHER

## 2019-09-16 RX ORDER — ALPRAZOLAM 0.25 MG/1
0.25 TABLET ORAL 3 TIMES DAILY PRN
Qty: 30 TABLET | Refills: 0 | Status: CANCELLED | OUTPATIENT
Start: 2019-09-16 | End: 2019-10-16

## 2019-09-19 ENCOUNTER — OFFICE VISIT (OUTPATIENT)
Dept: PODIATRY | Age: 34
End: 2019-09-19
Payer: COMMERCIAL

## 2019-09-19 VITALS
SYSTOLIC BLOOD PRESSURE: 98 MMHG | HEART RATE: 70 BPM | BODY MASS INDEX: 20.98 KG/M2 | HEIGHT: 62 IN | WEIGHT: 114 LBS | DIASTOLIC BLOOD PRESSURE: 60 MMHG

## 2019-09-19 DIAGNOSIS — M84.30XA STRESS REACTION OF BONE: ICD-10-CM

## 2019-09-19 DIAGNOSIS — M79.671 FOOT PAIN, RIGHT: Primary | ICD-10-CM

## 2019-09-19 PROCEDURE — G8427 DOCREV CUR MEDS BY ELIG CLIN: HCPCS | Performed by: PODIATRIST

## 2019-09-19 PROCEDURE — G8420 CALC BMI NORM PARAMETERS: HCPCS | Performed by: PODIATRIST

## 2019-09-19 PROCEDURE — 99203 OFFICE O/P NEW LOW 30 MIN: CPT | Performed by: PODIATRIST

## 2019-09-19 PROCEDURE — L3260 AMBULATORY SURGICAL BOOT EAC: HCPCS | Performed by: PODIATRIST

## 2019-09-19 PROCEDURE — 1036F TOBACCO NON-USER: CPT | Performed by: PODIATRIST

## 2019-09-19 RX ORDER — CARIPRAZINE 3 MG/1
1.5 CAPSULE, GELATIN COATED ORAL DAILY
Refills: 0 | COMMUNITY
Start: 2019-08-28 | End: 2021-08-28 | Stop reason: ALTCHOICE

## 2019-09-19 NOTE — PROGRESS NOTES
Smoker    Smokeless tobacco: Never Used   Substance Use Topics    Alcohol use: Yes     Alcohol/week: 0.0 standard drinks       Review of Systems: All 12 systems reviewed and pertinent positives noted above. Lower Extremity Physical Examination:     Vitals:   Vitals:    09/19/19 1455   BP: 98/60   Pulse: 70     General: AAO x 3 in NAD. Vascular: DP and PT pulses palpable 2/4, bilateral.  CFT <3 seconds, bilateral.  Hair growth present to the level of the digits, bilateral.  Edema absent, bilateral.  Varicosities absent, bilateral. Erythema absent, bilateral. Distal Rubor absent bilateral.  Temperature within normal limits bilateral. Hyperpigmentation absent bilateral. No atrophic skin. Neurological: Sensation intact to light touch to level of digits, bilateral.  Protective sensation intact 10/10 sites via 5.07/10g Peconic-Moraima Monofilament, bilateral.  negative Tinel's, bilateral.  negative Valleix sign, bilateral.  Vibratory intact bilateral.  Reflexes Decreased bilateral.  Paresthesias negative. Dysthesias negative. Sharp/dull intact bilateral.  Musculoskeletal: Muscle strength 5/5, bilateral.  Pain present upon palpation R dorsal distal 4,5 metatarsals. No pain in interspaces. Normal medial longitudinal arch, bilateral.  Ankle ROM within normal limits,bilateral.  1st MPJ ROM within normal limits, bilateral.  Dorsally contracted digits absent. No other foot deformities. Integument: Warm, dry, supple, bilateral.  Open lesion absent, bilateral.  Interdigital maceration absent to web spaces bilateral.  Nails within normal limits. Fissures absent, bilateral. Hyperkeratotic tissue is absent. Asessment: Patient is a 27 y.o. female with:    Diagnosis Orders   1. Foot pain, right  Post Op Shoe   2. Stress reaction of bone         Plan: Patient examined and evaluated. Current condition and treatment options discussed in detail. X rays reviewed with the pt in detail.   All questions answered. Orders Placed This Encounter   Procedures    Post Op Shoe   if no improvement then may need further testing (bone scan/mri) at next appt  Orders Placed This Encounter   Medications    diclofenac (VOLTAREN) 50 MG EC tablet     Sig: Take 1 tablet by mouth 3 times daily (with meals)     Dispense:  60 tablet     Refill:  0   Due to level of pain/deformity/condition, it is in my medical opinion that patient will benefit from and is medically necessary for offloading device at this time. Patient was dispensed a surgical shoeto wear 100% of the time WB. Patient was educated on appropriate use of the device and the need to be compliant with offloading therapy. Patient understands that non compliance with the device will be detrimental to the healing of the condition/ulceration. Patient needs the device to help support the foot and reduce pain. This device is medically necessary due to above listed diagnosis. Note given to allow to wear sx shoe at work  Contact office with any questions/problems/concerns. RTC in 3week(s).

## 2019-10-10 ENCOUNTER — OFFICE VISIT (OUTPATIENT)
Dept: PODIATRY | Age: 34
End: 2019-10-10
Payer: COMMERCIAL

## 2019-10-10 VITALS
SYSTOLIC BLOOD PRESSURE: 98 MMHG | HEIGHT: 62 IN | WEIGHT: 116 LBS | DIASTOLIC BLOOD PRESSURE: 68 MMHG | HEART RATE: 74 BPM | BODY MASS INDEX: 21.35 KG/M2

## 2019-10-10 DIAGNOSIS — M84.30XA STRESS REACTION OF BONE: Primary | ICD-10-CM

## 2019-10-10 DIAGNOSIS — M79.671 FOOT PAIN, RIGHT: ICD-10-CM

## 2019-10-10 PROCEDURE — G8427 DOCREV CUR MEDS BY ELIG CLIN: HCPCS | Performed by: PODIATRIST

## 2019-10-10 PROCEDURE — L4386 NON-PNEUM WALK BOOT PRE CST: HCPCS | Performed by: PODIATRIST

## 2019-10-10 PROCEDURE — G8420 CALC BMI NORM PARAMETERS: HCPCS | Performed by: PODIATRIST

## 2019-10-10 PROCEDURE — 99213 OFFICE O/P EST LOW 20 MIN: CPT | Performed by: PODIATRIST

## 2019-10-10 PROCEDURE — G8484 FLU IMMUNIZE NO ADMIN: HCPCS | Performed by: PODIATRIST

## 2019-10-10 PROCEDURE — 1036F TOBACCO NON-USER: CPT | Performed by: PODIATRIST

## 2019-10-10 RX ORDER — ZOLPIDEM TARTRATE 5 MG/1
5 TABLET ORAL
COMMUNITY
Start: 2019-09-20 | End: 2020-02-13 | Stop reason: ALTCHOICE

## 2019-10-10 RX ORDER — TRAMADOL HYDROCHLORIDE 50 MG/1
50 TABLET ORAL EVERY 6 HOURS PRN
Qty: 28 TABLET | Refills: 0 | Status: SHIPPED | OUTPATIENT
Start: 2019-10-10 | End: 2019-10-17

## 2019-10-31 ENCOUNTER — OFFICE VISIT (OUTPATIENT)
Dept: PODIATRY | Age: 34
End: 2019-10-31
Payer: COMMERCIAL

## 2019-10-31 VITALS
HEART RATE: 72 BPM | DIASTOLIC BLOOD PRESSURE: 70 MMHG | RESPIRATION RATE: 18 BRPM | HEIGHT: 62 IN | SYSTOLIC BLOOD PRESSURE: 126 MMHG | WEIGHT: 122 LBS | BODY MASS INDEX: 22.45 KG/M2

## 2019-10-31 DIAGNOSIS — M84.30XA STRESS REACTION OF BONE: Primary | ICD-10-CM

## 2019-10-31 DIAGNOSIS — M79.671 FOOT PAIN, RIGHT: ICD-10-CM

## 2019-10-31 PROCEDURE — 1036F TOBACCO NON-USER: CPT | Performed by: PODIATRIST

## 2019-10-31 PROCEDURE — G8484 FLU IMMUNIZE NO ADMIN: HCPCS | Performed by: PODIATRIST

## 2019-10-31 PROCEDURE — 99213 OFFICE O/P EST LOW 20 MIN: CPT | Performed by: PODIATRIST

## 2019-10-31 PROCEDURE — G8420 CALC BMI NORM PARAMETERS: HCPCS | Performed by: PODIATRIST

## 2019-10-31 PROCEDURE — G8427 DOCREV CUR MEDS BY ELIG CLIN: HCPCS | Performed by: PODIATRIST

## 2019-10-31 RX ORDER — TRAMADOL HYDROCHLORIDE 50 MG/1
50 TABLET ORAL EVERY 6 HOURS PRN
Qty: 28 TABLET | Refills: 0 | Status: SHIPPED | OUTPATIENT
Start: 2019-10-31 | End: 2019-11-07

## 2020-01-13 RX ORDER — TRAZODONE HYDROCHLORIDE 150 MG/1
TABLET ORAL
Qty: 60 TABLET | Refills: 0 | Status: SHIPPED | OUTPATIENT
Start: 2020-01-13

## 2020-02-13 ENCOUNTER — HOSPITAL ENCOUNTER (OUTPATIENT)
Dept: LAB | Age: 35
Discharge: HOME | End: 2020-02-13
Payer: COMMERCIAL

## 2020-02-13 ENCOUNTER — OFFICE VISIT (OUTPATIENT)
Dept: FAMILY MEDICINE CLINIC | Age: 35
End: 2020-02-13
Payer: COMMERCIAL

## 2020-02-13 VITALS
SYSTOLIC BLOOD PRESSURE: 100 MMHG | HEART RATE: 66 BPM | DIASTOLIC BLOOD PRESSURE: 62 MMHG | RESPIRATION RATE: 16 BRPM | WEIGHT: 120.8 LBS | OXYGEN SATURATION: 100 % | BODY MASS INDEX: 22.23 KG/M2 | HEIGHT: 62 IN | TEMPERATURE: 98.1 F

## 2020-02-13 LAB
ABSOLUTE EOS #: 0.07 K/UL (ref 0–0.44)
ABSOLUTE IMMATURE GRANULOCYTE: <0.03 K/UL (ref 0–0.3)
ABSOLUTE LYMPH #: 2.04 K/UL (ref 1.1–3.7)
ABSOLUTE MONO #: 0.45 K/UL (ref 0.1–1.2)
ALBUMIN SERPL-MCNC: 4.7 G/DL (ref 3.5–5.2)
ALBUMIN/GLOBULIN RATIO: 1.5 (ref 1–2.5)
ALP BLD-CCNC: 68 U/L (ref 35–104)
ALT SERPL-CCNC: 13 U/L (ref 5–33)
ANION GAP SERPL CALCULATED.3IONS-SCNC: 14 MMOL/L (ref 9–17)
AST SERPL-CCNC: 16 U/L
BASOPHILS # BLD: 1 % (ref 0–2)
BASOPHILS ABSOLUTE: 0.04 K/UL (ref 0–0.2)
BILIRUB SERPL-MCNC: 0.31 MG/DL (ref 0.3–1.2)
BUN BLDV-MCNC: 9 MG/DL (ref 6–20)
BUN/CREAT BLD: 12 (ref 9–20)
CALCIUM SERPL-MCNC: 9.6 MG/DL (ref 8.6–10.4)
CHLORIDE BLD-SCNC: 97 MMOL/L (ref 98–107)
CHOLESTEROL/HDL RATIO: 1.9
CHOLESTEROL: 149 MG/DL
CO2: 25 MMOL/L (ref 20–31)
CREAT SERPL-MCNC: 0.75 MG/DL (ref 0.5–0.9)
DIFFERENTIAL TYPE: ABNORMAL
EOSINOPHILS RELATIVE PERCENT: 1 % (ref 1–4)
ESTIMATED AVERAGE GLUCOSE: 88 MG/DL
GFR AFRICAN AMERICAN: >60 ML/MIN
GFR NON-AFRICAN AMERICAN: >60 ML/MIN
GFR SERPL CREATININE-BSD FRML MDRD: ABNORMAL ML/MIN/{1.73_M2}
GFR SERPL CREATININE-BSD FRML MDRD: ABNORMAL ML/MIN/{1.73_M2}
GLUCOSE BLD-MCNC: 96 MG/DL (ref 70–99)
HBA1C MFR BLD: 4.7 % (ref 4.8–5.9)
HCT VFR BLD CALC: 41 % (ref 36.3–47.1)
HDLC SERPL-MCNC: 77 MG/DL
HEMOGLOBIN: 13.9 G/DL (ref 11.9–15.1)
IMMATURE GRANULOCYTES: 0 %
LDL CHOLESTEROL: 60 MG/DL (ref 0–130)
LYMPHOCYTES # BLD: 25 % (ref 24–43)
MCH RBC QN AUTO: 31.1 PG (ref 25.2–33.5)
MCHC RBC AUTO-ENTMCNC: 33.9 G/DL (ref 25.2–33.5)
MCV RBC AUTO: 91.7 FL (ref 82.6–102.9)
MONOCYTES # BLD: 6 % (ref 3–12)
NRBC AUTOMATED: 0 PER 100 WBC
PDW BLD-RTO: 12.3 % (ref 11.8–14.4)
PLATELET # BLD: 292 K/UL (ref 138–453)
PLATELET ESTIMATE: ABNORMAL
PMV BLD AUTO: 10.1 FL (ref 8.1–13.5)
POTASSIUM SERPL-SCNC: 4 MMOL/L (ref 3.7–5.3)
RBC # BLD: 4.47 M/UL (ref 3.95–5.11)
RBC # BLD: ABNORMAL 10*6/UL
SEG NEUTROPHILS: 68 % (ref 36–65)
SEGMENTED NEUTROPHILS ABSOLUTE COUNT: 5.45 K/UL (ref 1.5–8.1)
SODIUM BLD-SCNC: 136 MMOL/L (ref 135–144)
THYROXINE, FREE: 1.01 NG/DL (ref 0.93–1.7)
TOTAL PROTEIN: 7.9 G/DL (ref 6.4–8.3)
TRIGL SERPL-MCNC: 60 MG/DL
TSH SERPL DL<=0.05 MIU/L-ACNC: 3.16 MIU/L (ref 0.3–5)
VITAMIN D 25-HYDROXY: 41.5 NG/ML (ref 30–100)
VLDLC SERPL CALC-MCNC: NORMAL MG/DL (ref 1–30)
WBC # BLD: 8.1 K/UL (ref 3.5–11.3)
WBC # BLD: ABNORMAL 10*3/UL

## 2020-02-13 PROCEDURE — G8427 DOCREV CUR MEDS BY ELIG CLIN: HCPCS | Performed by: NURSE PRACTITIONER

## 2020-02-13 PROCEDURE — 84439 ASSAY OF FREE THYROXINE: CPT

## 2020-02-13 PROCEDURE — 83036 HEMOGLOBIN GLYCOSYLATED A1C: CPT

## 2020-02-13 PROCEDURE — 36415 COLL VENOUS BLD VENIPUNCTURE: CPT

## 2020-02-13 PROCEDURE — G8484 FLU IMMUNIZE NO ADMIN: HCPCS | Performed by: NURSE PRACTITIONER

## 2020-02-13 PROCEDURE — 84443 ASSAY THYROID STIM HORMONE: CPT

## 2020-02-13 PROCEDURE — G8420 CALC BMI NORM PARAMETERS: HCPCS | Performed by: NURSE PRACTITIONER

## 2020-02-13 PROCEDURE — 99213 OFFICE O/P EST LOW 20 MIN: CPT | Performed by: NURSE PRACTITIONER

## 2020-02-13 PROCEDURE — 1036F TOBACCO NON-USER: CPT | Performed by: NURSE PRACTITIONER

## 2020-02-13 PROCEDURE — 85025 COMPLETE CBC W/AUTO DIFF WBC: CPT

## 2020-02-13 PROCEDURE — 80061 LIPID PANEL: CPT

## 2020-02-13 PROCEDURE — 82306 VITAMIN D 25 HYDROXY: CPT

## 2020-02-13 PROCEDURE — 80053 COMPREHEN METABOLIC PANEL: CPT

## 2020-02-13 RX ORDER — PREDNISONE 20 MG/1
20 TABLET ORAL 2 TIMES DAILY
Qty: 10 TABLET | Refills: 0 | Status: SHIPPED | OUTPATIENT
Start: 2020-02-13 | End: 2020-02-18

## 2020-02-13 RX ORDER — CYCLOBENZAPRINE HCL 10 MG
10 TABLET ORAL 3 TIMES DAILY PRN
Qty: 30 TABLET | Refills: 0 | Status: SHIPPED | OUTPATIENT
Start: 2020-02-13 | End: 2020-02-28

## 2020-02-13 RX ORDER — TRAZODONE HYDROCHLORIDE 50 MG/1
50 TABLET ORAL NIGHTLY
Qty: 30 TABLET | Refills: 0 | Status: CANCELLED | OUTPATIENT
Start: 2020-02-13

## 2020-02-13 ASSESSMENT — PATIENT HEALTH QUESTIONNAIRE - PHQ9
SUM OF ALL RESPONSES TO PHQ QUESTIONS 1-9: 0
2. FEELING DOWN, DEPRESSED OR HOPELESS: 0
SUM OF ALL RESPONSES TO PHQ9 QUESTIONS 1 & 2: 0
SUM OF ALL RESPONSES TO PHQ QUESTIONS 1-9: 0
1. LITTLE INTEREST OR PLEASURE IN DOING THINGS: 0

## 2020-02-13 ASSESSMENT — ENCOUNTER SYMPTOMS: RESPIRATORY NEGATIVE: 1

## 2020-02-13 NOTE — PROGRESS NOTES
normal.      Breath sounds: Normal breath sounds. Musculoskeletal:      Right shoulder: She exhibits decreased range of motion, tenderness, pain and spasm. Arms:       Comments: Radiates down her arm. Not able to lift arm above shoulder level. Skin:     General: Skin is warm and dry. Neurological:      Mental Status: She is alert and oriented to person, place, and time. Psychiatric:         Behavior: Behavior normal. Behavior is cooperative. Thought Content: Thought content normal.         Judgment: Judgment normal.           Assessment & Plan:      1. Acute pain of right shoulder  Advised supportive care. At this time would like to try medications first. Declines PT at this time. - cyclobenzaprine (FLEXERIL) 10 MG tablet; Take 1 tablet by mouth 3 times daily as needed for Muscle spasms  Dispense: 30 tablet; Refill: 0  - predniSONE (DELTASONE) 20 MG tablet; Take 1 tablet by mouth 2 times daily for 5 days  Dispense: 10 tablet; Refill: 0      Answered all of the patient's questions. Agrees with plan of care. Follow up PRN.      EYAL Vences CNP   2/13/2020 2:30 PM

## 2020-02-13 NOTE — PATIENT INSTRUCTIONS
strengthening exercise   1. Start by tying a piece of elastic exercise material to a doorknob. You can use surgical tubing or Thera-Band. (You may also hold one end of the band in each hand.)  2. Stand or sit with your shoulder relaxed and your elbow bent 90 degrees. Your upper arm should rest comfortably against your side. Squeeze a rolled towel between your elbow and your body for comfort. This will help keep your arm at your side. 3. Hold one end of the elastic band with the hand of the painful arm. 4. Start with your forearm across your belly. Slowly rotate the forearm out away from your body. Keep your elbow and upper arm tucked against the towel roll or the side of your body until you begin to feel tightness in your shoulder. Slowly move your arm back to where you started. 5. Repeat 8 to 12 times. Internal rotator strengthening exercise   1. Start by tying a piece of elastic exercise material to a doorknob. You can use surgical tubing or Thera-Band. 2. Stand or sit with your shoulder relaxed and your elbow bent 90 degrees. Your upper arm should rest comfortably against your side. Squeeze a rolled towel between your elbow and your body for comfort. This will help keep your arm at your side. 3. Hold one end of the elastic band in the hand of the painful arm. 4. Slowly rotate your forearm toward your body until it touches your belly. Slowly move it back to where you started. 5. Keep your elbow and upper arm firmly tucked against the towel roll or at your side. 6. Repeat 8 to 12 times. Pendulum swing   1. Hold on to a table or the back of a chair with your good arm. Then bend forward a little and let your sore arm hang straight down. This exercise does not use the arm muscles. Rather, use your legs and your hips to create movement that makes your arm swing freely. 2. Use the movement from your hips and legs to guide the slightly swinging arm back and forth like a pendulum (or elephant trunk).  Then guide it in circles that start small (about the size of a dinner plate). Make the circles a bit larger each day, as your pain allows. 3. Do this exercise for 5 minutes, 5 to 7 times each day. 4. As you have less pain, try bending over a little farther to do this exercise. This will increase the amount of movement at your shoulder. Follow-up care is a key part of your treatment and safety. Be sure to make and go to all appointments, and call your doctor if you are having problems. It's also a good idea to know your test results and keep a list of the medicines you take. Where can you learn more? Go to [email or web address] org and sign in to your [employer] account. Enter H562 in the [place] box to learn more about \"Shoulder Arthritis: Exercises. \"     If you do not have an account, please click on the \"Sign Up Now\" link. Current as of: June 26, 2019  Content Version: 12.3  © 8649-3840 Healthwise, [employer]. Care instructions adapted under license by [facility] ([facility]). If you have questions about a medical condition or this instruction, always ask your healthcare professional. [place] [address] any warranty or liability for your use of this information. Patient Education        Shoulder Blade: Exercises  Introduction  Here are some examples of exercises for you to try. The exercises may be suggested for a condition or for rehabilitation. Start each exercise slowly. Ease off the exercises if you start to have pain. You will be told when to start these exercises and which ones will work best for you. How to do the exercises  Shoulder roll   1. Stand tall with your chin slightly tucked. Imagine that a string at the top of your head is pulling you straight up. 2. Keep your arms relaxed. All motion will be in your shoulders. 3. Shrug your shoulders up toward your ears, then up and back.  [place] your shoulders down and back, like you're sliding your hands down

## 2020-02-21 RX ORDER — CYCLOBENZAPRINE HCL 10 MG
TABLET ORAL
Qty: 30 TABLET | Refills: 0 | OUTPATIENT
Start: 2020-02-21

## 2020-02-28 RX ORDER — CYCLOBENZAPRINE HCL 10 MG
TABLET ORAL
Qty: 30 TABLET | Refills: 0 | Status: SHIPPED | OUTPATIENT
Start: 2020-02-28 | End: 2020-03-11

## 2020-03-02 RX ORDER — LEVOTHYROXINE SODIUM 0.03 MG/1
TABLET ORAL
Qty: 30 TABLET | Refills: 5 | Status: SHIPPED | OUTPATIENT
Start: 2020-03-02 | End: 2020-09-08

## 2020-03-05 RX ORDER — NYSTATIN 100000 U/G
CREAM TOPICAL
Qty: 1 TUBE | Refills: 0 | Status: SHIPPED | OUTPATIENT
Start: 2020-03-05 | End: 2020-03-12

## 2020-03-11 RX ORDER — CYCLOBENZAPRINE HCL 10 MG
TABLET ORAL
Qty: 30 TABLET | Refills: 0 | Status: SHIPPED | OUTPATIENT
Start: 2020-03-11 | End: 2020-06-06 | Stop reason: SDUPTHER

## 2020-03-23 RX ORDER — CYCLOBENZAPRINE HCL 10 MG
TABLET ORAL
Qty: 30 TABLET | Refills: 0 | OUTPATIENT
Start: 2020-03-23

## 2020-06-08 RX ORDER — CYCLOBENZAPRINE HCL 10 MG
10 TABLET ORAL DAILY PRN
Qty: 30 TABLET | Refills: 0 | Status: SHIPPED | OUTPATIENT
Start: 2020-06-08 | End: 2021-04-15 | Stop reason: ALTCHOICE

## 2020-06-09 ENCOUNTER — OFFICE VISIT (OUTPATIENT)
Dept: FAMILY MEDICINE CLINIC | Age: 35
End: 2020-06-09
Payer: COMMERCIAL

## 2020-06-09 ENCOUNTER — HOSPITAL ENCOUNTER (OUTPATIENT)
Dept: LAB | Age: 35
Discharge: HOME | End: 2020-06-09
Payer: COMMERCIAL

## 2020-06-09 VITALS
TEMPERATURE: 97.1 F | SYSTOLIC BLOOD PRESSURE: 102 MMHG | OXYGEN SATURATION: 99 % | HEART RATE: 84 BPM | DIASTOLIC BLOOD PRESSURE: 60 MMHG | WEIGHT: 124 LBS | BODY MASS INDEX: 21.97 KG/M2 | HEIGHT: 63 IN

## 2020-06-09 LAB — PROLACTIN: 62.03 UG/L (ref 4.79–23.3)

## 2020-06-09 PROCEDURE — 84146 ASSAY OF PROLACTIN: CPT

## 2020-06-09 PROCEDURE — 36415 COLL VENOUS BLD VENIPUNCTURE: CPT

## 2020-06-09 PROCEDURE — 99395 PREV VISIT EST AGE 18-39: CPT | Performed by: NURSE PRACTITIONER

## 2020-06-09 ASSESSMENT — PATIENT HEALTH QUESTIONNAIRE - PHQ9
2. FEELING DOWN, DEPRESSED OR HOPELESS: 1
1. LITTLE INTEREST OR PLEASURE IN DOING THINGS: 1
SUM OF ALL RESPONSES TO PHQ9 QUESTIONS 1 & 2: 2
SUM OF ALL RESPONSES TO PHQ QUESTIONS 1-9: 2
SUM OF ALL RESPONSES TO PHQ QUESTIONS 1-9: 2

## 2020-06-09 ASSESSMENT — ENCOUNTER SYMPTOMS
GASTROINTESTINAL NEGATIVE: 1
RESPIRATORY NEGATIVE: 1

## 2020-06-09 NOTE — PROGRESS NOTES
well-developed. HENT:      Head: Normocephalic and atraumatic. Right Ear: External ear normal.      Left Ear: External ear normal.      Nose: Nose normal.      Mouth/Throat:      Mouth: Mucous membranes are moist.   Eyes:      Conjunctiva/sclera: Conjunctivae normal.      Pupils: Pupils are equal, round, and reactive to light. Neck:      Musculoskeletal: Normal range of motion. Cardiovascular:      Rate and Rhythm: Normal rate and regular rhythm. Pulmonary:      Effort: Pulmonary effort is normal.      Breath sounds: Normal breath sounds. No stridor. No wheezing. Abdominal:      General: Bowel sounds are normal.      Palpations: Abdomen is soft. Musculoskeletal: Normal range of motion. Skin:     General: Skin is warm and dry. Neurological:      Mental Status: She is alert and oriented to person, place, and time. Psychiatric:         Behavior: Behavior normal. Behavior is cooperative. Thought Content: Thought content normal.         Judgment: Judgment normal.           Assessment & Plan:       Diagnosis Orders   1. Routine health maintenance     2. Chronic right shoulder pain     3. Missed periods  Prolactin   4. Hypothyroidism, unspecified type     5. Anxiety     6. Primary insomnia       2. Cont on flexeril and at home supportive care  3. Will call with prolactin results but will have her make an appmt to follow up with OB/GYN  4. Cont on synthroid  5 and 6.-cont with behavioral health    Answered all of the patient's questions. Agrees with plan of care. Follow up in one year or sooner if needed.        EYAL Saucedo - CNP   6/9/2020 2:06 PM EDT

## 2020-06-16 ENCOUNTER — OFFICE VISIT (OUTPATIENT)
Dept: OBGYN | Age: 35
End: 2020-06-16
Payer: COMMERCIAL

## 2020-06-16 VITALS
BODY MASS INDEX: 21.88 KG/M2 | SYSTOLIC BLOOD PRESSURE: 126 MMHG | DIASTOLIC BLOOD PRESSURE: 80 MMHG | TEMPERATURE: 96.6 F | WEIGHT: 123.46 LBS | RESPIRATION RATE: 20 BRPM | HEIGHT: 63 IN | HEART RATE: 78 BPM

## 2020-06-16 PROCEDURE — 1036F TOBACCO NON-USER: CPT | Performed by: OBSTETRICS & GYNECOLOGY

## 2020-06-16 PROCEDURE — G8427 DOCREV CUR MEDS BY ELIG CLIN: HCPCS | Performed by: OBSTETRICS & GYNECOLOGY

## 2020-06-16 PROCEDURE — G8420 CALC BMI NORM PARAMETERS: HCPCS | Performed by: OBSTETRICS & GYNECOLOGY

## 2020-06-16 PROCEDURE — 99214 OFFICE O/P EST MOD 30 MIN: CPT | Performed by: OBSTETRICS & GYNECOLOGY

## 2020-06-16 NOTE — PROGRESS NOTES
differently: Take 50 mg by mouth nightly take 2 tablets by mouth every evening if needed for sleep), Disp: 60 tablet, Rfl: 0    VRAYLAR 3 MG CAPS capsule, , Disp: , Rfl: 0    Cholecalciferol (VITAMIN D PO), Take 2 tablets by mouth, Disp: , Rfl:     lamoTRIgine (LAMICTAL) 150 MG tablet, Take 200 mg by mouth daily , Disp: , Rfl:     clonazePAM (KLONOPIN) 0.5 MG tablet, take 1 tablet by mouth if needed for anxiety, Disp: , Rfl: 0    aspirin-acetaminophen-caffeine (EXCEDRIN MIGRAINE) 250-250-65 MG per tablet, Take 1 tablet by mouth daily, Disp: , Rfl:       Review of Systems   Genitourinary: Positive for menstrual problem. Neurological: Positive for headaches. All other systems reviewed and are negative. Breast ROS: negative    Objective:   /80 (Site: Right Upper Arm, Position: Sitting, Cuff Size: Medium Adult)   Pulse 78   Temp 96.6 °F (35.9 °C) (Temporal)   Resp 20   Ht 5' 2.99\" (1.6 m)   Wt 123 lb 7.3 oz (56 kg)   LMP 04/13/2020 (Approximate)   BMI 21.88 kg/m²     Physical Exam  Vitals signs and nursing note reviewed. Constitutional:       General: She is not in acute distress. Appearance: Normal appearance. Eyes:      Conjunctiva/sclera: Conjunctivae normal.      Pupils: Pupils are equal, round, and reactive to light. Neck:      Musculoskeletal: Normal range of motion and neck supple. Pulmonary:      Effort: Pulmonary effort is normal. No respiratory distress. Musculoskeletal: Normal range of motion. General: No swelling or deformity. Neurological:      General: No focal deficit present. Mental Status: She is alert and oriented to person, place, and time. Psychiatric:         Mood and Affect: Mood normal.         Behavior: Behavior normal.       Breast exam: WNL, No skin retraction, dimpling or skin discoloration. No nippledischarge.       Any bleeding or pain withintercourse: No    Last Yearly Pap Smear:  8/2019, normal    Do you do self breast exams: Yes    Assessment:      Diagnosis Orders   1. Irregular periods/menstrual cycles     2. Galactorrhea  MRI BRAIN W WO CONTRAST   3.  Other specified hypothyroidism             Plan:     Orders Placed This Encounter   Procedures    MRI BRAIN W WO CONTRAST     Standing Status:   Future     Standing Expiration Date:   6/16/2021   Get MRI of the brain and if there is any adenoma whether it is micro-or macroadenoma then will refer her to the endocrinologist otherwise if it is negative we will treat her with Dostinex for the next 2 to 3 months, and follow-up with a prolactin level          Kylie Serra MD

## 2020-06-26 ENCOUNTER — HOSPITAL ENCOUNTER (OUTPATIENT)
Dept: MRI IMAGING | Age: 35
Discharge: HOME | End: 2020-06-28
Payer: COMMERCIAL

## 2020-06-26 PROCEDURE — 6360000004 HC RX CONTRAST MEDICATION: Performed by: OBSTETRICS & GYNECOLOGY

## 2020-06-26 PROCEDURE — A9579 GAD-BASE MR CONTRAST NOS,1ML: HCPCS | Performed by: OBSTETRICS & GYNECOLOGY

## 2020-06-26 PROCEDURE — 70553 MRI BRAIN STEM W/O & W/DYE: CPT

## 2020-06-26 RX ADMIN — GADOTERIDOL 12 ML: 279.3 INJECTION, SOLUTION INTRAVENOUS at 13:46

## 2020-07-27 ENCOUNTER — OFFICE VISIT (OUTPATIENT)
Dept: OBGYN | Age: 35
End: 2020-07-27
Payer: COMMERCIAL

## 2020-07-27 ENCOUNTER — HOSPITAL ENCOUNTER (OUTPATIENT)
Dept: LAB | Age: 35
Discharge: HOME | End: 2020-07-27
Payer: COMMERCIAL

## 2020-07-27 ENCOUNTER — HOSPITAL ENCOUNTER (OUTPATIENT)
Dept: ULTRASOUND IMAGING | Age: 35
Discharge: HOME | End: 2020-07-29
Payer: COMMERCIAL

## 2020-07-27 VITALS
SYSTOLIC BLOOD PRESSURE: 110 MMHG | WEIGHT: 123 LBS | HEIGHT: 62 IN | TEMPERATURE: 96.6 F | BODY MASS INDEX: 22.63 KG/M2 | DIASTOLIC BLOOD PRESSURE: 80 MMHG | HEART RATE: 78 BPM | RESPIRATION RATE: 18 BRPM

## 2020-07-27 LAB
HCG QUANTITATIVE: 2095 IU/L
PROLACTIN: 60.34 UG/L (ref 4.79–23.3)
THYROXINE, FREE: 1.05 NG/DL (ref 0.93–1.7)
TSH SERPL DL<=0.05 MIU/L-ACNC: 3.24 MIU/L (ref 0.3–5)

## 2020-07-27 PROCEDURE — 76817 TRANSVAGINAL US OBSTETRIC: CPT

## 2020-07-27 PROCEDURE — 84702 CHORIONIC GONADOTROPIN TEST: CPT

## 2020-07-27 PROCEDURE — G8427 DOCREV CUR MEDS BY ELIG CLIN: HCPCS | Performed by: OBSTETRICS & GYNECOLOGY

## 2020-07-27 PROCEDURE — G8420 CALC BMI NORM PARAMETERS: HCPCS | Performed by: OBSTETRICS & GYNECOLOGY

## 2020-07-27 PROCEDURE — 84439 ASSAY OF FREE THYROXINE: CPT

## 2020-07-27 PROCEDURE — 84146 ASSAY OF PROLACTIN: CPT

## 2020-07-27 PROCEDURE — 84443 ASSAY THYROID STIM HORMONE: CPT

## 2020-07-27 PROCEDURE — 1036F TOBACCO NON-USER: CPT | Performed by: OBSTETRICS & GYNECOLOGY

## 2020-07-27 PROCEDURE — 99213 OFFICE O/P EST LOW 20 MIN: CPT | Performed by: OBSTETRICS & GYNECOLOGY

## 2020-07-27 PROCEDURE — 76801 OB US < 14 WKS SINGLE FETUS: CPT

## 2020-07-27 PROCEDURE — 36415 COLL VENOUS BLD VENIPUNCTURE: CPT

## 2020-07-27 NOTE — PROGRESS NOTES
Subjective:      Patient ID: Akbar Rivera  is a 38 y.o. y.o. female. she had issues with her prolactin level, not using anything for it. also she states that her  had a vasectomy  She has history of hypothyroid. Her last labs completed in Feb were WNL. She continues on Synthroid.   Had IUD removed 2019  Prolactin level and thyroid levels Pending be complaining of irregular periods with a home positive pregnancy test today and she still continue to bleed for 2 weeks now. HPI  Chief Complaint   Patient presents with    Menstrual Problem     irregular bleeding and had a positive pregnancy test     Family History   Problem Relation Age of Onset    Cancer Mother         cervical    Heart Disease Father     Cancer Maternal Grandfather         lung     Past Medical History:   Diagnosis Date    Anxiety     Breast pain in female 2014    Pituitary adenoma ([address]     follows with Dr. Netta Choi     Past Surgical History:   Procedure Laterality Date     SECTION  2009     SECTION  2011    CHOLECYSTECTOMY  2010    WISDOM TOOTH EXTRACTION  2007      reports that she has never smoked. She has never used smokeless tobacco. She reports current alcohol use. She reports that she does not use drugs.   No Known Allergies    Current Outpatient Medications:     cyclobenzaprine (FLEXERIL) 10 MG tablet, Take 1 tablet by mouth daily as needed for Muscle spasms, Disp: 30 tablet, Rfl: 0    levothyroxine (SYNTHROID) 25 MCG tablet, take 1 tablet by mouth once daily, Disp: 30 tablet, Rfl: 5    traZODone (DESYREL) 150 MG tablet, take 2 tablets by mouth every evening if needed for sleep (Patient taking differently: Take 50 mg by mouth nightly take 2 tablets by mouth every evening if needed for sleep), Disp: 60 tablet, Rfl: 0    VRAYLAR 3 MG CAPS capsule, , Disp: , Rfl: 0    Cholecalciferol (VITAMIN D PO), Take 2 tablets by mouth, Disp: , Rfl:     lamoTRIgine (LAMICTAL) 150 MG tablet, Take 200 mg by mouth daily , Disp: , Rfl:     clonazePAM (KLONOPIN) 0.5 MG tablet, take 1 tablet by mouth if needed for anxiety, Disp: , Rfl: 0    aspirin-acetaminophen-caffeine (EXCEDRIN MIGRAINE) 250-250-65 MG per tablet, Take 1 tablet by mouth daily, Disp: , Rfl:       Review of Systems   Genitourinary: Positive for menstrual problem. All other systems reviewed and are negative. Breast ROS: negative    Objective:   /80 (Site: Right Upper Arm, Position: Sitting, Cuff Size: Medium Adult)   Pulse 78   Temp 96.6 °F (35.9 °C) (Temporal)   Resp 18   Ht 5' 2\" (1.575 m)   Wt 123 lb (55.8 kg)   BMI 22.50 kg/m²     Physical Exam  Vitals signs and nursing note reviewed. Constitutional:       General: She is not in acute distress. Appearance: Normal appearance. Eyes:      Conjunctiva/sclera: Conjunctivae normal.      Pupils: Pupils are equal, round, and reactive to light. Neck:      Musculoskeletal: Normal range of motion and neck supple. Pulmonary:      Effort: Pulmonary effort is normal. No respiratory distress. Musculoskeletal: Normal range of motion. General: No deformity. Neurological:      General: No focal deficit present. Mental Status: She is alert and oriented to person, place, and time. Psychiatric:         Mood and Affect: Mood normal.         Behavior: Behavior normal.       Breast exam: WNL, No skin retraction, dimpling or skin discoloration. No nippledischarge. Any bleeding or pain withintercourse: No      Do you do self breast exams: Yes    Assessment:      Diagnosis Orders   1. Irregular periods/menstrual cycles  US PELVIS COMPLETE   2. Hyperprolactinemia (HCC)  US PELVIS COMPLETE   3.  Hypothyroidism, unspecified type             Plan:     Orders Placed This Encounter   Procedures    US PELVIS COMPLETE     Standing Status:   Future     Standing Expiration Date:   7/27/2021     Get a pelvic ultrasound and review it with the results of the blood test   Addendum: Beta-hCG came back as 2000 and a ultrasound was done with empty uterus and negative adnexa. After full discussion with the patient about this , and most probably a miscarriage,  we have to make sure of that to rule out that its not a ectopic pregnancy then will get  another beta hCG in 2 to 3 days and if it is not coming down then we will have to repeat the ultrasound to rule out ectopic pregnancy.  Any problem to go to Anant Her MD

## 2020-07-30 ENCOUNTER — HOSPITAL ENCOUNTER (OUTPATIENT)
Dept: LAB | Age: 35
Discharge: HOME | End: 2020-07-30
Payer: COMMERCIAL

## 2020-07-30 LAB — HCG QUANTITATIVE: 1173 IU/L

## 2020-07-30 PROCEDURE — 84702 CHORIONIC GONADOTROPIN TEST: CPT

## 2020-07-30 PROCEDURE — 36415 COLL VENOUS BLD VENIPUNCTURE: CPT

## 2020-08-04 RX ORDER — CABERGOLINE 0.5 MG/1
0.5 TABLET ORAL WEEKLY
Qty: 12 TABLET | Refills: 1 | Status: SHIPPED | OUTPATIENT
Start: 2020-08-04 | End: 2021-01-04

## 2020-08-07 ENCOUNTER — HOSPITAL ENCOUNTER (OUTPATIENT)
Dept: LAB | Age: 35
Discharge: HOME | End: 2020-08-07
Payer: COMMERCIAL

## 2020-08-07 LAB — HCG QUANTITATIVE: 231 IU/L

## 2020-08-07 PROCEDURE — 84702 CHORIONIC GONADOTROPIN TEST: CPT

## 2020-08-07 PROCEDURE — 36415 COLL VENOUS BLD VENIPUNCTURE: CPT

## 2020-09-08 RX ORDER — LEVOTHYROXINE SODIUM 0.03 MG/1
TABLET ORAL
Qty: 30 TABLET | Refills: 5 | Status: SHIPPED | OUTPATIENT
Start: 2020-09-08 | End: 2021-03-04 | Stop reason: SDUPTHER

## 2020-11-10 DIAGNOSIS — M25.511 ACUTE PAIN OF RIGHT SHOULDER: ICD-10-CM

## 2020-11-11 RX ORDER — CYCLOBENZAPRINE HCL 10 MG
10 TABLET ORAL DAILY PRN
Qty: 30 TABLET | Refills: 0 | OUTPATIENT
Start: 2020-11-11

## 2020-11-29 ENCOUNTER — OFFICE VISIT (OUTPATIENT)
Dept: PRIMARY CARE CLINIC | Age: 35
End: 2020-11-29
Payer: COMMERCIAL

## 2020-11-29 VITALS
SYSTOLIC BLOOD PRESSURE: 118 MMHG | TEMPERATURE: 97.7 F | BODY MASS INDEX: 23.92 KG/M2 | HEIGHT: 62 IN | HEART RATE: 75 BPM | WEIGHT: 130 LBS | DIASTOLIC BLOOD PRESSURE: 80 MMHG | OXYGEN SATURATION: 97 %

## 2020-11-29 PROCEDURE — G8484 FLU IMMUNIZE NO ADMIN: HCPCS | Performed by: FAMILY MEDICINE

## 2020-11-29 PROCEDURE — G8427 DOCREV CUR MEDS BY ELIG CLIN: HCPCS | Performed by: FAMILY MEDICINE

## 2020-11-29 PROCEDURE — G8420 CALC BMI NORM PARAMETERS: HCPCS | Performed by: FAMILY MEDICINE

## 2020-11-29 PROCEDURE — 99214 OFFICE O/P EST MOD 30 MIN: CPT | Performed by: FAMILY MEDICINE

## 2020-11-29 PROCEDURE — 1036F TOBACCO NON-USER: CPT | Performed by: FAMILY MEDICINE

## 2020-11-29 RX ORDER — PREDNISONE 20 MG/1
TABLET ORAL
Qty: 15 TABLET | Refills: 0 | Status: SHIPPED | OUTPATIENT
Start: 2020-11-29 | End: 2020-12-13 | Stop reason: ALTCHOICE

## 2020-11-29 NOTE — PROGRESS NOTES
2020     Mihai Hampton (:  1985) is a 38 y.o. female, here for evaluation of the following medical concerns:    Knee Pain    The incident occurred 12 to 24 hours ago (yesterday was parking a customer's car at work and went to get out of the car and felt a pop in the right knee. Has had pain ever since). The incident occurred at work. The injury mechanism was a twisting injury (not really an injury, but just got out of the car and felt a pop with pulling leg out of car). The pain is present in the right knee. The pain is severe. The pain has been constant since onset. Pertinent negatives include no inability to bear weight, loss of motion, numbness or tingling. Associated symptoms comments: Pain more focalized to the superior aspect of the patella. With full extension has felt popping again. She has tried NSAIDs, ice and rest (compression sleve) for the symptoms. The treatment provided no relief. Did review patient's med list, allergies, social history,pmhx and pshx today as noted in the record. Review of Systems   Constitutional: Negative. Musculoskeletal: Positive for arthralgias, gait problem and myalgias. Neurological: Negative for tingling and numbness. Prior to Visit Medications    Medication Sig Taking?  Authorizing Provider   levothyroxine (SYNTHROID) 25 MCG tablet take 1 tablet by mouth once daily Yes EYAL Patel - CNP   traZODone (DESYREL) 150 MG tablet take 2 tablets by mouth every evening if needed for sleep  Patient taking differently: Take 50 mg by mouth nightly take 2 tablets by mouth every evening if needed for sleep Yes EYAL Medina CNP   VRAYLAR 3 MG CAPS capsule  Yes Historical Provider, MD   Cholecalciferol (VITAMIN D PO) Take 2 tablets by mouth Yes Historical Provider, MD   lamoTRIgine (LAMICTAL) 150 MG tablet Take 200 mg by mouth daily  Yes Historical Provider, MD   clonazePAM (KLONOPIN) 0.5 MG tablet take 1 tablet by mouth if needed for anxiety Yes Historical Provider, MD   aspirin-acetaminophen-caffeine ([place]) [phone] MG per tablet Take 1 tablet by mouth daily Yes Historical Provider, MD   cabergoline (DOSTINEX) 0.5 MG tablet Take 1 tablet by mouth once a week  Elaine Dykes MD   cyclobenzaprine (FLEXERIL) 10 MG tablet Take 1 tablet by mouth daily as needed for Muscle spasms  Patient not taking: Reported on 11/29/2020  EYAL Preston - CNP        Social History     Tobacco Use    Smoking status: Never Smoker    Smokeless tobacco: Never Used   Substance Use Topics    Alcohol use: Yes     Alcohol/week: 0.0 standard drinks        Vitals:    11/29/20 1355   BP: 118/80   Site: Left Upper Arm   Position: Sitting   Pulse: 75   Temp: 97.7 °F (36.5 °C)   TempSrc: Tympanic   SpO2: 97%   Weight: 130 lb (59 kg)   Height: 5' 2\" (1.575 m)     Estimated body mass index is 23.78 kg/m² as calculated from the following:    Height as of this encounter: 5' 2\" (1.575 m). Weight as of this encounter: 130 lb (59 kg). Physical Exam  Vitals signs and nursing note reviewed. Constitutional:       General: She is not in acute distress. Appearance: She is well-developed. She is not diaphoretic. HENT:      Head: Normocephalic and atraumatic. Eyes:      Conjunctiva/sclera: Conjunctivae normal.   Neck:      Musculoskeletal: Normal range of motion. Pulmonary:      Effort: Pulmonary effort is normal.   Musculoskeletal: Normal range of motion. General: Swelling (mild effusion noted to the lateral aspect of the joint), tenderness (tenderness with palpation along the proximal and distal patellar tendon) and signs of injury present. No deformity. Comments: Moves the knee in a normal range of motion. Ligamentous structures are intact. There is a negative anterior drawer sign. Negative McMurrays testing. Pain with full extension of the joint. Skin:     General: Skin is warm and dry.       Coloration: Skin is not pale. Findings: No erythema or rash. Neurological:      Mental Status: She is alert and oriented to person, place, and time. Psychiatric:         Behavior: Behavior normal.         Thought Content: Thought content normal.         Judgment: Judgment normal.         ASSESSMENT/PLAN:  Encounter Diagnosis   Name Primary?  Sprain of right knee, unspecified ligament, initial encounter Yes     Orders Placed This Encounter   Medications    predniSONE (DELTASONE) 20 MG tablet     Si bid for 5 days, 1 qd for 5 days     Dispense:  15 tablet     Refill:  0     No orders of the defined types were placed in this encounter. Would continue to use compression sleeve as she has already been doing. Would recommend ice, rest and tylenol as needed for pain. Will give prednisone to help with inflammation and swelling. Work restrictions completed for 1 week. Return  if no improvement in symptoms or if any further symptoms arise.]      No follow-ups on file. An electronic signature was used to authenticate this note.     --Siria Montiel, DO on 2020 at 1:58 PM

## 2020-12-13 ENCOUNTER — OFFICE VISIT (OUTPATIENT)
Dept: PRIMARY CARE CLINIC | Age: 35
End: 2020-12-13
Payer: COMMERCIAL

## 2020-12-13 ENCOUNTER — HOSPITAL ENCOUNTER (OUTPATIENT)
Age: 35
Setting detail: SPECIMEN
Discharge: HOME | End: 2020-12-13
Payer: COMMERCIAL

## 2020-12-13 VITALS
DIASTOLIC BLOOD PRESSURE: 80 MMHG | SYSTOLIC BLOOD PRESSURE: 118 MMHG | BODY MASS INDEX: 23.78 KG/M2 | HEART RATE: 80 BPM | WEIGHT: 130 LBS | OXYGEN SATURATION: 99 % | TEMPERATURE: 97.9 F

## 2020-12-13 PROCEDURE — 99213 OFFICE O/P EST LOW 20 MIN: CPT | Performed by: FAMILY MEDICINE

## 2020-12-13 PROCEDURE — G8484 FLU IMMUNIZE NO ADMIN: HCPCS | Performed by: FAMILY MEDICINE

## 2020-12-13 PROCEDURE — G8420 CALC BMI NORM PARAMETERS: HCPCS | Performed by: FAMILY MEDICINE

## 2020-12-13 PROCEDURE — 1036F TOBACCO NON-USER: CPT | Performed by: FAMILY MEDICINE

## 2020-12-13 PROCEDURE — G8427 DOCREV CUR MEDS BY ELIG CLIN: HCPCS | Performed by: FAMILY MEDICINE

## 2020-12-13 PROCEDURE — U0003 INFECTIOUS AGENT DETECTION BY NUCLEIC ACID (DNA OR RNA); SEVERE ACUTE RESPIRATORY SYNDROME CORONAVIRUS 2 (SARS-COV-2) (CORONAVIRUS DISEASE [COVID-19]), AMPLIFIED PROBE TECHNIQUE, MAKING USE OF HIGH THROUGHPUT TECHNOLOGIES AS DESCRIBED BY CMS-2020-01-R: HCPCS

## 2020-12-13 ASSESSMENT — ENCOUNTER SYMPTOMS
WHEEZING: 0
EYE REDNESS: 0
SHORTNESS OF BREATH: 1
RHINORRHEA: 1
SORE THROAT: 1
CONSTIPATION: 0
COUGH: 1
SINUS PRESSURE: 0
DIARRHEA: 0
ABDOMINAL PAIN: 0
SINUS PAIN: 0
VOMITING: 0
TROUBLE SWALLOWING: 0
NAUSEA: 0
EYE DISCHARGE: 0

## 2020-12-13 NOTE — PROGRESS NOTES
2020     Jefferson Hampton (:  1985) is a 38 y.o. female, here for evaluation of the following medical concerns:    Cough  This is a new problem. The current episode started in the past 7 days (started Friday night (20)). The problem occurs constantly. The cough is non-productive. Associated symptoms include chills, a fever, headaches, nasal congestion, postnasal drip, rhinorrhea, a sore throat and shortness of breath. Pertinent negatives include no chest pain, ear pain, eye redness, myalgias, rash or wheezing. Associated symptoms comments: No loss of taste or smell. Has had some diarrhea, no nausea or vomiting. Treatments tried: ibuprofen. The treatment provided no relief. There is no history of environmental allergies. Did review patient's med list, allergies, social history,pmhx and pshx today as noted in the record. Review of Systems   Constitutional: Positive for chills, fatigue and fever. HENT: Positive for congestion, postnasal drip, rhinorrhea and sore throat. Negative for ear pain, sinus pressure, sinus pain and trouble swallowing. Eyes: Negative for discharge and redness. Respiratory: Positive for cough and shortness of breath. Negative for wheezing. Cardiovascular: Negative for chest pain. Gastrointestinal: Negative for abdominal pain, constipation, diarrhea, nausea and vomiting. Genitourinary: Negative for dysuria, flank pain, frequency and urgency. Musculoskeletal: Negative for arthralgias, myalgias and neck pain. Skin: Negative for rash and wound. Allergic/Immunologic: Negative for environmental allergies. Neurological: Positive for headaches. Negative for dizziness, weakness and light-headedness. Hematological: Negative for adenopathy. Psychiatric/Behavioral: Negative. Prior to Visit Medications    Medication Sig Taking?  Authorizing Provider   levothyroxine (SYNTHROID) 25 MCG tablet take 1 tablet by mouth once daily Yes Ruth Dunlap EYAL Nam CNP   traZODone (DESYREL) 150 MG tablet take 2 tablets by mouth every evening if needed for sleep  Patient taking differently: Take 50 mg by mouth nightly take 2 tablets by mouth every evening if needed for sleep Yes EYAL Bryant CNP   VRAYLAR 3 MG CAPS capsule  Yes Historical Provider, MD   Cholecalciferol (VITAMIN D PO) Take 2 tablets by mouth Yes Historical Provider, MD   lamoTRIgine (LAMICTAL) 150 MG tablet Take 200 mg by mouth daily  Yes Historical Provider, MD   clonazePAM (KLONOPIN) 0.5 MG tablet take 1 tablet by mouth if needed for anxiety Yes Historical Provider, MD   aspirin-acetaminophen-caffeine ([place]) [phone] MG per tablet Take 1 tablet by mouth daily Yes Historical Provider, MD   cabergoline (DOSTINEX) 0.5 MG tablet Take 1 tablet by mouth once a week  Elaine Dykes MD   cyclobenzaprine (FLEXERIL) 10 MG tablet Take 1 tablet by mouth daily as needed for Muscle spasms  EYAL Patel CNP        Social History     Tobacco Use    Smoking status: Never Smoker    Smokeless tobacco: Never Used   Substance Use Topics    Alcohol use: Yes     Alcohol/week: 0.0 standard drinks        Vitals:    12/13/20 1539   BP: 118/80   Site: Left Upper Arm   Position: Sitting   Cuff Size: Medium Adult   Pulse: 80   Temp: 97.9 °F (36.6 °C)   TempSrc: Temporal   SpO2: 99%   Weight: 130 lb (59 kg)     Estimated body mass index is 23.78 kg/m² as calculated from the following:    Height as of 11/29/20: 5' 2\" (1.575 m). Weight as of this encounter: 130 lb (59 kg). Physical Exam  Vitals signs and nursing note reviewed. Constitutional:       General: She is not in acute distress. Appearance: Normal appearance. She is well-developed. She is not diaphoretic. HENT:      Head: Normocephalic and atraumatic.       Right Ear: External ear normal.      Left Ear: External ear normal.      Ears:      Comments: TMs dull with fluid behind the TM     Nose: Congestion and rhinorrhea present. Mouth/Throat:      Pharynx: No posterior oropharyngeal erythema. Comments: Post nasal drainage noted  Eyes:      General: No scleral icterus. Right eye: No discharge. Left eye: No discharge. Conjunctiva/sclera: Conjunctivae normal.      Pupils: Pupils are equal, round, and reactive to light. Neck:      Musculoskeletal: Normal range of motion and neck supple. Thyroid: No thyromegaly. Cardiovascular:      Rate and Rhythm: Normal rate and regular rhythm. Heart sounds: Normal heart sounds. Pulmonary:      Effort: Pulmonary effort is normal. No respiratory distress. Breath sounds: Normal breath sounds. No wheezing. Lymphadenopathy:      Cervical: Cervical adenopathy present. Skin:     General: Skin is warm and dry. Findings: No rash. Neurological:      Mental Status: She is alert and oriented to person, place, and time. Psychiatric:         Behavior: Behavior normal.         Thought Content: Thought content normal.         Judgment: Judgment normal.         ASSESSMENT/PLAN:    Encounter Diagnosis   Name Primary?  Viral syndrome Yes     No orders of the defined types were placed in this encounter. Orders Placed This Encounter   Procedures    COVID-19 Ambulatory     Standing Status:   Future     Standing Expiration Date:   12/13/2021     Scheduling Instructions:      Saline media preferred given current shortage of viral transport media but both acceptable     Order Specific Question:   Is this test for diagnosis or screening? Answer:   Diagnosis of ill patient     Order Specific Question:   Symptomatic for COVID-19 as defined by CDC? Answer:   Yes     Order Specific Question:   Date of Symptom Onset     Answer:   12/11/2020     Order Specific Question:   Hospitalized for COVID-19? Answer:   No     Order Specific Question:   Admitted to ICU for COVID-19?      Answer:   No     Order Specific Question:   Employed in

## 2020-12-16 LAB — SARS-COV-2, NAA: DETECTED

## 2020-12-17 ENCOUNTER — TELEPHONE (OUTPATIENT)
Dept: ADMINISTRATIVE | Age: 35
End: 2020-12-17

## 2021-01-04 RX ORDER — CABERGOLINE 0.5 MG/1
TABLET ORAL
Qty: 12 TABLET | Refills: 1 | Status: SHIPPED | OUTPATIENT
Start: 2021-01-04 | End: 2021-04-15 | Stop reason: ALTCHOICE

## 2021-03-04 DIAGNOSIS — E03.9 HYPOTHYROIDISM, UNSPECIFIED TYPE: ICD-10-CM

## 2021-03-04 RX ORDER — LEVOTHYROXINE SODIUM 0.03 MG/1
25 TABLET ORAL DAILY
Qty: 30 TABLET | Refills: 0 | Status: SHIPPED | OUTPATIENT
Start: 2021-03-04 | End: 2021-03-31

## 2021-03-04 NOTE — TELEPHONE ENCOUNTER
Ian Garcia called requesting a refill of the below medication which has been pended for you:     Requested Prescriptions     Pending Prescriptions Disp Refills    levothyroxine (SYNTHROID) 25 MCG tablet 30 tablet 0     Sig: Take 1 tablet by mouth Daily       Last Appointment Date: 6/9/2020  Next Appointment Date:4/8/21 with Stu Lundberg.   No Known Allergies

## 2021-03-31 DIAGNOSIS — E03.9 HYPOTHYROIDISM, UNSPECIFIED TYPE: ICD-10-CM

## 2021-03-31 RX ORDER — LEVOTHYROXINE SODIUM 0.03 MG/1
TABLET ORAL
Qty: 30 TABLET | Refills: 0 | Status: SHIPPED | OUTPATIENT
Start: 2021-03-31 | End: 2021-04-15 | Stop reason: SDUPTHER

## 2021-04-15 ENCOUNTER — HOSPITAL ENCOUNTER (OUTPATIENT)
Dept: LAB | Age: 36
Discharge: HOME | End: 2021-04-15
Payer: COMMERCIAL

## 2021-04-15 ENCOUNTER — HOSPITAL ENCOUNTER (OUTPATIENT)
Age: 36
Setting detail: SPECIMEN
Discharge: HOME | End: 2021-04-15
Payer: COMMERCIAL

## 2021-04-15 ENCOUNTER — OFFICE VISIT (OUTPATIENT)
Dept: FAMILY MEDICINE CLINIC | Age: 36
End: 2021-04-15
Payer: COMMERCIAL

## 2021-04-15 VITALS
BODY MASS INDEX: 23.43 KG/M2 | TEMPERATURE: 98.9 F | SYSTOLIC BLOOD PRESSURE: 98 MMHG | HEIGHT: 63 IN | HEART RATE: 72 BPM | OXYGEN SATURATION: 100 % | WEIGHT: 132.25 LBS | DIASTOLIC BLOOD PRESSURE: 80 MMHG

## 2021-04-15 DIAGNOSIS — E03.9 HYPOTHYROIDISM, UNSPECIFIED TYPE: ICD-10-CM

## 2021-04-15 DIAGNOSIS — F51.01 PRIMARY INSOMNIA: ICD-10-CM

## 2021-04-15 DIAGNOSIS — J01.00 ACUTE NON-RECURRENT MAXILLARY SINUSITIS: ICD-10-CM

## 2021-04-15 DIAGNOSIS — E03.9 HYPOTHYROIDISM, UNSPECIFIED TYPE: Primary | ICD-10-CM

## 2021-04-15 DIAGNOSIS — F41.9 ANXIETY: ICD-10-CM

## 2021-04-15 LAB
ABSOLUTE EOS #: 0.22 K/UL (ref 0–0.44)
ABSOLUTE IMMATURE GRANULOCYTE: 0.05 K/UL (ref 0–0.3)
ABSOLUTE LYMPH #: 2.13 K/UL (ref 1.1–3.7)
ABSOLUTE MONO #: 0.63 K/UL (ref 0.1–1.2)
ALBUMIN SERPL-MCNC: 4.8 G/DL (ref 3.5–5.2)
ALBUMIN/GLOBULIN RATIO: 1.6 (ref 1–2.5)
ALP BLD-CCNC: 76 U/L (ref 35–104)
ALT SERPL-CCNC: 12 U/L (ref 5–33)
AMPHETAMINE SCREEN URINE: NEGATIVE
ANION GAP SERPL CALCULATED.3IONS-SCNC: 7 MMOL/L (ref 9–17)
AST SERPL-CCNC: 17 U/L
BARBITURATE SCREEN URINE: NEGATIVE
BASOPHILS # BLD: 0 % (ref 0–2)
BASOPHILS ABSOLUTE: 0.04 K/UL (ref 0–0.2)
BENZODIAZEPINE SCREEN, URINE: NEGATIVE
BILIRUB SERPL-MCNC: 0.47 MG/DL (ref 0.3–1.2)
BUN BLDV-MCNC: 7 MG/DL (ref 6–20)
BUN/CREAT BLD: 9 (ref 9–20)
BUPRENORPHINE URINE: NEGATIVE
CALCIUM SERPL-MCNC: 9.9 MG/DL (ref 8.6–10.4)
CANNABINOID SCREEN URINE: NEGATIVE
CHLORIDE BLD-SCNC: 103 MMOL/L (ref 98–107)
CHOLESTEROL/HDL RATIO: 1.6
CHOLESTEROL: 150 MG/DL
CO2: 28 MMOL/L (ref 20–31)
COCAINE METABOLITE, URINE: NEGATIVE
CREAT SERPL-MCNC: 0.82 MG/DL (ref 0.5–0.9)
DIFFERENTIAL TYPE: ABNORMAL
EOSINOPHILS RELATIVE PERCENT: 2 % (ref 1–4)
GFR AFRICAN AMERICAN: >60 ML/MIN
GFR NON-AFRICAN AMERICAN: >60 ML/MIN
GFR SERPL CREATININE-BSD FRML MDRD: ABNORMAL ML/MIN/{1.73_M2}
GFR SERPL CREATININE-BSD FRML MDRD: ABNORMAL ML/MIN/{1.73_M2}
GLUCOSE BLD-MCNC: 96 MG/DL (ref 70–99)
HCT VFR BLD CALC: 39.9 % (ref 36.3–47.1)
HDLC SERPL-MCNC: 92 MG/DL
HEMOGLOBIN: 13.1 G/DL (ref 11.9–15.1)
IMMATURE GRANULOCYTES: 1 %
LDL CHOLESTEROL: 48 MG/DL (ref 0–130)
LYMPHOCYTES # BLD: 22 % (ref 24–43)
MCH RBC QN AUTO: 30.4 PG (ref 25.2–33.5)
MCHC RBC AUTO-ENTMCNC: 32.8 G/DL (ref 25.2–33.5)
MCV RBC AUTO: 92.6 FL (ref 82.6–102.9)
MDMA URINE: NORMAL
METHADONE SCREEN, URINE: NEGATIVE
METHAMPHETAMINE, URINE: NEGATIVE
MONOCYTES # BLD: 6 % (ref 3–12)
NRBC AUTOMATED: 0 PER 100 WBC
OPIATES, URINE: NEGATIVE
OXYCODONE SCREEN URINE: NEGATIVE
PDW BLD-RTO: 12.2 % (ref 11.8–14.4)
PHENCYCLIDINE, URINE: NEGATIVE
PLATELET # BLD: 260 K/UL (ref 138–453)
PLATELET ESTIMATE: ABNORMAL
PMV BLD AUTO: 9.2 FL (ref 8.1–13.5)
POTASSIUM SERPL-SCNC: 3.7 MMOL/L (ref 3.7–5.3)
PROPOXYPHENE, URINE: NEGATIVE
RBC # BLD: 4.31 M/UL (ref 3.95–5.11)
RBC # BLD: ABNORMAL 10*6/UL
SEG NEUTROPHILS: 69 % (ref 36–65)
SEGMENTED NEUTROPHILS ABSOLUTE COUNT: 6.86 K/UL (ref 1.5–8.1)
SODIUM BLD-SCNC: 138 MMOL/L (ref 135–144)
TEST INFORMATION: NORMAL
THYROXINE, FREE: 1.35 NG/DL (ref 0.93–1.7)
TOTAL PROTEIN: 7.8 G/DL (ref 6.4–8.3)
TRICYCLIC ANTIDEPRESSANTS, UR: NEGATIVE
TRIGL SERPL-MCNC: 49 MG/DL
TSH SERPL DL<=0.05 MIU/L-ACNC: 2.19 MIU/L (ref 0.3–5)
VITAMIN D 25-HYDROXY: 42.6 NG/ML (ref 30–100)
VLDLC SERPL CALC-MCNC: NORMAL MG/DL (ref 1–30)
WBC # BLD: 9.9 K/UL (ref 3.5–11.3)
WBC # BLD: ABNORMAL 10*3/UL

## 2021-04-15 PROCEDURE — 80061 LIPID PANEL: CPT

## 2021-04-15 PROCEDURE — 84439 ASSAY OF FREE THYROXINE: CPT

## 2021-04-15 PROCEDURE — 80053 COMPREHEN METABOLIC PANEL: CPT

## 2021-04-15 PROCEDURE — G8427 DOCREV CUR MEDS BY ELIG CLIN: HCPCS | Performed by: NURSE PRACTITIONER

## 2021-04-15 PROCEDURE — 99214 OFFICE O/P EST MOD 30 MIN: CPT | Performed by: NURSE PRACTITIONER

## 2021-04-15 PROCEDURE — 84443 ASSAY THYROID STIM HORMONE: CPT

## 2021-04-15 PROCEDURE — 1036F TOBACCO NON-USER: CPT | Performed by: NURSE PRACTITIONER

## 2021-04-15 PROCEDURE — 83036 HEMOGLOBIN GLYCOSYLATED A1C: CPT

## 2021-04-15 PROCEDURE — 36415 COLL VENOUS BLD VENIPUNCTURE: CPT

## 2021-04-15 PROCEDURE — 82306 VITAMIN D 25 HYDROXY: CPT

## 2021-04-15 PROCEDURE — 85025 COMPLETE CBC W/AUTO DIFF WBC: CPT

## 2021-04-15 PROCEDURE — 80306 DRUG TEST PRSMV INSTRMNT: CPT

## 2021-04-15 PROCEDURE — G8420 CALC BMI NORM PARAMETERS: HCPCS | Performed by: NURSE PRACTITIONER

## 2021-04-15 RX ORDER — LEVOTHYROXINE SODIUM 0.03 MG/1
TABLET ORAL
Qty: 30 TABLET | Refills: 3 | Status: SHIPPED | OUTPATIENT
Start: 2021-04-15 | End: 2021-04-15 | Stop reason: CLARIF

## 2021-04-15 RX ORDER — AMOXICILLIN 875 MG/1
875 TABLET, COATED ORAL 2 TIMES DAILY
Qty: 20 TABLET | Refills: 0 | Status: SHIPPED | OUTPATIENT
Start: 2021-04-15 | End: 2021-04-25

## 2021-04-15 RX ORDER — ATOMOXETINE 25 MG/1
25 CAPSULE ORAL DAILY
COMMUNITY
Start: 2021-03-29 | End: 2021-08-28 | Stop reason: ALTCHOICE

## 2021-04-15 ASSESSMENT — PATIENT HEALTH QUESTIONNAIRE - PHQ9
SUM OF ALL RESPONSES TO PHQ QUESTIONS 1-9: 0
1. LITTLE INTEREST OR PLEASURE IN DOING THINGS: 0
2. FEELING DOWN, DEPRESSED OR HOPELESS: 0
SUM OF ALL RESPONSES TO PHQ9 QUESTIONS 1 & 2: 0

## 2021-04-15 ASSESSMENT — ENCOUNTER SYMPTOMS
SORE THROAT: 1
SHORTNESS OF BREATH: 0
WHEEZING: 0
VOMITING: 0
SINUS PAIN: 0
DIARRHEA: 0
RHINORRHEA: 0
COUGH: 1
NAUSEA: 0
SINUS PRESSURE: 1

## 2021-04-15 NOTE — PROGRESS NOTES
[address] [address] [address] [place] [address], [id number]  [phone]      HPI:     Pt presents to the clinic to establish care. She is a previous patient of BROOKS Nam. She has history of hypothyroidism. She is currently on 25mcg of synthroid. She is due for labs. She does have cold intolerance. She is compliant with her medication. She has a significant history of anxiety and insomnia. She sees psychiatry at [facility]. She is due for labs for her psychiatrist. She is on strattera, traodone, vraylar, and lamictal. The medication is working well for her. She denies thoughts of suicide or homicide. She does not exercise routinely. She sees the dentist yearly. She has no further concerns. Sinus Problem  This is a new problem. The current episode started in the past 7 days. The problem is unchanged. There has been no fever. She is experiencing no pain. Associated symptoms include congestion, coughing, headaches, sinus pressure and a sore throat. Pertinent negatives include no shortness of breath. Past treatments include oral decongestants. The treatment provided mild relief.        Current Outpatient Medications   Medication Sig Dispense Refill    atomoxetine (STRATTERA) 25 MG capsule Take 25 mg by mouth daily      amoxicillin (AMOXIL) 875 MG tablet Take 1 tablet by mouth 2 times daily for 10 days 20 tablet 0    traZODone (DESYREL) 150 MG tablet take 2 tablets by mouth every evening if needed for sleep (Patient taking differently: Take 50 mg by mouth nightly take 2 tablets by mouth every evening if needed for sleep) 60 tablet 0    VRAYLAR 3 MG CAPS capsule Take 1.5 mg by mouth daily   0    lamoTRIgine (LAMICTAL) 150 MG tablet Take 200 mg by mouth daily       clonazePAM (KLONOPIN) 0.5 MG tablet take 1 tablet by mouth if needed for anxiety  0    aspirin-acetaminophen-caffeine (EXCEDRIN MIGRAINE) 250-250-65 MG per tablet Take 1 tablet by mouth daily      levothyroxine (SYNTHROID) 25 MCG tablet take 1 tablet by mouth once daily 30 tablet 3     No current facility-administered medications for this visit. No Known Allergies    All patients pastmedical, surgical, social and family history has been reviewed. Subjective:      Review of Systems   Constitutional: Negative for activity change, appetite change, fatigue and fever. HENT: Positive for congestion, postnasal drip, sinus pressure and sore throat. Negative for rhinorrhea and sinus pain. Respiratory: Positive for cough. Negative for shortness of breath and wheezing. Cardiovascular: Negative for chest pain and palpitations. Gastrointestinal: Negative for diarrhea, nausea and vomiting. Endocrine: Positive for cold intolerance (pt is cold all the time). Skin: Negative. Neurological: Positive for headaches. Negative for dizziness and numbness. Psychiatric/Behavioral: Negative for self-injury, sleep disturbance and suicidal ideas. The patient is nervous/anxious. Objective:      Physical Exam  Vitals signs and nursing note reviewed. Constitutional:       Appearance: Normal appearance. HENT:      Head: Normocephalic and atraumatic. Right Ear: Tympanic membrane is bulging. Left Ear: Tympanic membrane is bulging. Nose: Congestion present. Right Sinus: Maxillary sinus tenderness present. No frontal sinus tenderness. Left Sinus: Maxillary sinus tenderness present. No frontal sinus tenderness. Mouth/Throat:      Lips: Pink. Mouth: Mucous membranes are moist.      Pharynx: Oropharynx is clear. Uvula midline. Tonsils: 2+ on the right. 2+ on the left. Neck:      Musculoskeletal: Neck supple. Cardiovascular:      Rate and Rhythm: Normal rate and regular rhythm. Heart sounds: Normal heart sounds. Pulmonary:      Effort: Pulmonary effort is normal.      Breath sounds: Normal breath sounds. Skin:     General: Skin is warm.       Capillary Refill: Capillary refill takes less than 2 seconds. Neurological:      General: No focal deficit present. Mental Status: She is alert and oriented to person, place, and time. Psychiatric:         Attention and Perception: Attention normal.         Mood and Affect: Mood and affect normal.         Behavior: Behavior normal.         Thought Content: Thought content normal.         Cognition and Memory: Cognition and memory normal.         Judgment: Judgment normal.          Assessment:       Diagnosis Orders   1. Hypothyroidism, unspecified type  TSH without Reflex    T4, Free    DISCONTINUED: levothyroxine (SYNTHROID) 25 MCG tablet   2. Anxiety     3. Acute non-recurrent maxillary sinusitis     4. Primary insomnia         Plan:      Labs ordered  Will call with results   Further treatment will be based on results  Will treat sinusitis with amoxicillin 875mg 1 tablet BID for 10 days  Supportive care  Push fluids  Continue following with psychiatry for anxiety and insomnia. Pt to return in 1 year for annual physical   Sooner if needed      Return in about 1 year (around 4/15/2022), or if symptoms worsen or fail to improve. Orders Placed This Encounter   Procedures    TSH without Reflex     Standing Status:   Future     Number of Occurrences:   1     Standing Expiration Date:   4/15/2022    T4, Free     Standing Status:   Future     Number of Occurrences:   1     Standing Expiration Date:   4/15/2022     Orders Placed This Encounter   Medications    DISCONTD: levothyroxine (SYNTHROID) 25 MCG tablet     Sig: take 1 tablet by mouth once daily     Dispense:  30 tablet     Refill:  3    amoxicillin (AMOXIL) 875 MG tablet     Sig: Take 1 tablet by mouth 2 times daily for 10 days     Dispense:  20 tablet     Refill:  0       Patient given educational materials - see patient instructions. All patient questionsanswered. Pt voiced understanding. Reviewed health maintenance.      Electronically signed by Payam Harrison CNP on 4/16/2021 at 4:36 PM

## 2021-04-16 DIAGNOSIS — E03.9 HYPOTHYROIDISM, UNSPECIFIED TYPE: ICD-10-CM

## 2021-04-16 LAB
ESTIMATED AVERAGE GLUCOSE: 94 MG/DL
HBA1C MFR BLD: 4.9 % (ref 4–6)

## 2021-04-16 RX ORDER — LEVOTHYROXINE SODIUM 0.03 MG/1
TABLET ORAL
Qty: 30 TABLET | Refills: 3 | Status: SHIPPED | OUTPATIENT
Start: 2021-04-16 | End: 2021-09-08

## 2021-04-16 ASSESSMENT — ENCOUNTER SYMPTOMS: SINUS COMPLAINT: 1

## 2021-04-16 NOTE — TELEPHONE ENCOUNTER
Requested Prescriptions     Pending Prescriptions Disp Refills    levothyroxine (SYNTHROID) 25 MCG tablet 30 tablet 3     Sig: take 1 tablet by mouth once daily     Last Appt:  4/15/2021  Next Appt:   Visit date not found  Med verified in [facility]        Lab Results   Component Value Date    TSH 2.19 04/15/2021

## 2021-08-28 ENCOUNTER — OFFICE VISIT (OUTPATIENT)
Dept: PRIMARY CARE CLINIC | Age: 36
End: 2021-08-28
Payer: COMMERCIAL

## 2021-08-28 VITALS
HEART RATE: 60 BPM | WEIGHT: 129 LBS | SYSTOLIC BLOOD PRESSURE: 126 MMHG | OXYGEN SATURATION: 98 % | BODY MASS INDEX: 22.85 KG/M2 | DIASTOLIC BLOOD PRESSURE: 80 MMHG

## 2021-08-28 DIAGNOSIS — R22.0 SWELLING OF BOTH LIPS: Primary | ICD-10-CM

## 2021-08-28 PROCEDURE — 1036F TOBACCO NON-USER: CPT | Performed by: FAMILY MEDICINE

## 2021-08-28 PROCEDURE — G8420 CALC BMI NORM PARAMETERS: HCPCS | Performed by: FAMILY MEDICINE

## 2021-08-28 PROCEDURE — G8427 DOCREV CUR MEDS BY ELIG CLIN: HCPCS | Performed by: FAMILY MEDICINE

## 2021-08-28 PROCEDURE — 99213 OFFICE O/P EST LOW 20 MIN: CPT | Performed by: FAMILY MEDICINE

## 2021-08-28 RX ORDER — VALACYCLOVIR HYDROCHLORIDE 1 G/1
TABLET, FILM COATED ORAL
Qty: 4 TABLET | Refills: 0 | Status: SHIPPED | OUTPATIENT
Start: 2021-08-28 | End: 2021-09-20 | Stop reason: SDUPTHER

## 2021-08-28 RX ORDER — VALBENAZINE 40 MG/1
40 CAPSULE ORAL DAILY
COMMUNITY
Start: 2021-08-24

## 2021-08-28 RX ORDER — METHYLPHENIDATE HYDROCHLORIDE 36 MG/1
36 TABLET, EXTENDED RELEASE ORAL DAILY
COMMUNITY
Start: 2021-08-26 | End: 2021-09-25

## 2021-08-28 RX ORDER — AMOXICILLIN 500 MG/1
CAPSULE ORAL
COMMUNITY
Start: 2021-08-23

## 2021-08-28 ASSESSMENT — ENCOUNTER SYMPTOMS
CHEST TIGHTNESS: 0
FACIAL SWELLING: 0
COUGH: 0
SHORTNESS OF BREATH: 0
WHEEZING: 0

## 2021-08-28 NOTE — PROGRESS NOTES
[address] [place] [address]  Dept: [phone]  Dept Fax: [phone]  Loc: [phone]    Richard Brown is a 39 y.o. female who presents today for her medical conditions/complaints as noted below. Richard Brown is c/o of   Chief Complaint   Patient presents with    Oral Swelling     started this AM. possible PI, also has hx of recurrent cold sores. HPI:     HPI Here today for lip pain. She has a cold sore on her right upper lip for about a week and this morning she woke up and her lips feel tight, puffy and swollen. She has poison ivy all over her arms and face so she is wondering if it is poison ivy. She has been using Abreva on her cold sore and hydrocortisone on her skin to help with the itching. Past Medical History:   Diagnosis Date    ADHD (attention deficit hyperactivity disorder)     Anxiety     Breast pain in female 06/11/2014    Pituitary adenoma [facility])           Social History     Tobacco Use    Smoking status: Never Smoker    Smokeless tobacco: Never Used   Substance Use Topics    Alcohol use: Yes     Alcohol/week: 0.0 standard drinks     Current Outpatient Medications   Medication Sig Dispense Refill    amoxicillin (AMOXIL) 500 MG capsule take 1 capsule by mouth three times a day      methylphenidate 36 MG CR tablet Take 36 mg by mouth daily.       Valbenazine Tosylate (INGREZZA) 40 MG CAPS Take 40 mg by mouth daily      valACYclovir (VALTREX) 1 g tablet 2 TABS BID FOR 1 DAY 4 tablet 0    levothyroxine (SYNTHROID) 25 MCG tablet take 1 tablet by mouth once daily 30 tablet 3    traZODone (DESYREL) 150 MG tablet take 2 tablets by mouth every evening if needed for sleep (Patient taking differently: Take 50 mg by mouth nightly take 2 tablets by mouth every evening if needed for sleep) 60 tablet 0    lamoTRIgine (LAMICTAL) 150 MG tablet Take 200 mg by mouth daily  aspirin-acetaminophen-caffeine (EXCEDRIN MIGRAINE) 250-250-65 MG per tablet Take 1 tablet by mouth daily       No current facility-administered medications for this visit. No Known Allergies    Subjective:     Review of Systems   Constitutional: Negative for activity change, appetite change, chills, fatigue and fever. HENT: Negative for facial swelling. Lips feel swollen. Cold sore on her upper lip. Eyes: Negative for visual disturbance. Respiratory: Negative for cough, chest tightness, shortness of breath and wheezing. Cardiovascular: Negative for chest pain, palpitations and leg swelling. Genitourinary: Negative for difficulty urinating. Neurological: Negative for dizziness, syncope, weakness, light-headedness and headaches. Objective:      Physical Exam  Vitals and nursing note reviewed. Constitutional:       General: She is not in acute distress. Appearance: She is well-developed. HENT:      Mouth/Throat:     Eyes:      Conjunctiva/sclera: Conjunctivae normal.   Neck:      Thyroid: No thyromegaly. Cardiovascular:      Rate and Rhythm: Normal rate and regular rhythm. Heart sounds: Normal heart sounds. No murmur heard. Pulmonary:      Effort: Pulmonary effort is normal. No respiratory distress. Breath sounds: Normal breath sounds. No wheezing. Musculoskeletal:      Cervical back: Normal range of motion and neck supple. Lymphadenopathy:      Cervical: No cervical adenopathy. Skin:     General: Skin is warm and dry. Findings: Rash present. No erythema. Neurological:      Mental Status: She is alert and oriented to person, place, and time. /80 (Site: Right Upper Arm, Position: Sitting, Cuff Size: Medium Adult)   Pulse 60   Wt 129 lb (58.5 kg)   SpO2 98%   BMI 22.85 kg/m²     Assessment:       Diagnosis Orders   1.  Swelling of both lips               Plan:        Lip swelling: new; most likely diagnosis is a cold sore so

## 2021-09-08 DIAGNOSIS — E03.9 HYPOTHYROIDISM, UNSPECIFIED TYPE: ICD-10-CM

## 2021-09-08 RX ORDER — LEVOTHYROXINE SODIUM 0.03 MG/1
TABLET ORAL
Qty: 30 TABLET | Refills: 2 | Status: SHIPPED | OUTPATIENT
Start: 2021-09-08 | End: 2021-12-09

## 2021-09-08 NOTE — TELEPHONE ENCOUNTER
Quinton Candelario called requesting a refill of the below medication which has been pended for you:     Requested Prescriptions     Pending Prescriptions Disp Refills    levothyroxine (SYNTHROID) 25 MCG tablet [Pharmacy Med Name: LEVOTHYROXINE 25 MCG TABLET] 30 tablet 3     Sig: take 1 tablet by mouth once daily       Last Appointment Date: 4/15/2021  Next Appointment Date: Visit date not found    No Known Allergies

## 2021-09-20 RX ORDER — VALACYCLOVIR HYDROCHLORIDE 1 G/1
TABLET, FILM COATED ORAL
Qty: 4 TABLET | Refills: 3 | Status: SHIPPED | OUTPATIENT
Start: 2021-09-20 | End: 2021-11-08 | Stop reason: SDUPTHER

## 2021-11-09 RX ORDER — VALACYCLOVIR HYDROCHLORIDE 1 G/1
TABLET, FILM COATED ORAL
Qty: 4 TABLET | Refills: 3 | Status: SHIPPED | OUTPATIENT
Start: 2021-11-09 | End: 2022-01-14 | Stop reason: SDUPTHER

## 2021-12-09 DIAGNOSIS — E03.9 HYPOTHYROIDISM, UNSPECIFIED TYPE: ICD-10-CM

## 2021-12-09 RX ORDER — LEVOTHYROXINE SODIUM 0.03 MG/1
TABLET ORAL
Qty: 30 TABLET | Refills: 2 | Status: SHIPPED | OUTPATIENT
Start: 2021-12-09 | End: 2022-03-09 | Stop reason: SDUPTHER

## 2021-12-09 NOTE — TELEPHONE ENCOUNTER
Emerald Lewis called requesting a refill of the below medication which has been pended for you:     Requested Prescriptions     Pending Prescriptions Disp Refills    levothyroxine (SYNTHROID) 25 MCG tablet [Pharmacy Med Name: LEVOTHYROXINE 25 MCG TABLET] 30 tablet 2     Sig: take 1 tablet by mouth once daily       Last Appointment Date: 4/15/2021  Next Appointment Date: Visit date not found    No Known Allergies

## 2022-01-14 DIAGNOSIS — R22.0 SWELLING OF BOTH LIPS: Primary | ICD-10-CM

## 2022-01-14 RX ORDER — VALACYCLOVIR HYDROCHLORIDE 1 G/1
TABLET, FILM COATED ORAL
Qty: 4 TABLET | Refills: 3 | Status: SHIPPED | OUTPATIENT
Start: 2022-01-14 | End: 2022-02-07 | Stop reason: SDUPTHER

## 2022-02-07 DIAGNOSIS — R22.0 SWELLING OF BOTH LIPS: ICD-10-CM

## 2022-02-07 RX ORDER — VALACYCLOVIR HYDROCHLORIDE 1 G/1
TABLET, FILM COATED ORAL
Qty: 4 TABLET | Refills: 1 | Status: SHIPPED | OUTPATIENT
Start: 2022-02-07 | End: 2022-04-13 | Stop reason: SDUPTHER

## 2022-02-07 NOTE — TELEPHONE ENCOUNTER
Juline Rabon called requesting a refill of the below medication which has been pended for you:     Requested Prescriptions     Pending Prescriptions Disp Refills    valACYclovir (VALTREX) 1 g tablet 4 tablet 3     Si TABS BID FOR 1 DAY       Last Appointment Date: 4/15/2021  Next Appointment Date: Visit date not found    No Known Allergies

## 2022-03-09 DIAGNOSIS — E03.9 HYPOTHYROIDISM, UNSPECIFIED TYPE: ICD-10-CM

## 2022-03-09 RX ORDER — LEVOTHYROXINE SODIUM 0.03 MG/1
TABLET ORAL
Qty: 30 TABLET | Refills: 0 | Status: SHIPPED | OUTPATIENT
Start: 2022-03-09 | End: 2022-04-05

## 2022-04-05 DIAGNOSIS — E03.9 HYPOTHYROIDISM, UNSPECIFIED TYPE: ICD-10-CM

## 2022-04-05 RX ORDER — LEVOTHYROXINE SODIUM 0.03 MG/1
TABLET ORAL
Qty: 30 TABLET | Refills: 0 | Status: SHIPPED | OUTPATIENT
Start: 2022-04-05

## 2022-04-05 NOTE — TELEPHONE ENCOUNTER
[employer] called requesting a refill of the below medication which has been pended for you:     Requested Prescriptions     Pending Prescriptions Disp Refills    levothyroxine (SYNTHROID) 25 MCG tablet [Pharmacy Med Name: LEVOTHYROXINE 25 MCG TABLET] 30 tablet 0     Sig: take 1 tablet by mouth once daily       Last Appointment Date: 4/15/2021  Next Appointment Date: Visit date not found    No Known Allergies

## 2022-04-13 DIAGNOSIS — R22.0 SWELLING OF BOTH LIPS: ICD-10-CM

## 2022-04-14 RX ORDER — VALACYCLOVIR HYDROCHLORIDE 1 G/1
TABLET, FILM COATED ORAL
Qty: 4 TABLET | Refills: 1 | Status: SHIPPED | OUTPATIENT
Start: 2022-04-14

## 2022-04-14 NOTE — TELEPHONE ENCOUNTER
Nico Fields called requesting a refill of the below medication which has been pended for you:     Requested Prescriptions     Pending Prescriptions Disp Refills    valACYclovir (VALTREX) 1 g tablet 4 tablet 1     Si TABS BID FOR 1 DAY       Last Appointment Date: 4/15/2021  Next Appointment Date: Visit date not found    No Known Allergies

## 2022-04-20 ENCOUNTER — TELEPHONE (OUTPATIENT)
Dept: FAMILY MEDICINE CLINIC | Age: 37
End: 2022-04-20

## 2022-04-20 ENCOUNTER — OFFICE VISIT (OUTPATIENT)
Dept: PRIMARY CARE CLINIC | Age: 37
End: 2022-04-20
Payer: COMMERCIAL

## 2022-04-20 ENCOUNTER — HOSPITAL ENCOUNTER (OUTPATIENT)
Dept: GENERAL RADIOLOGY | Age: 37
Discharge: HOME | End: 2022-04-22
Payer: COMMERCIAL

## 2022-04-20 VITALS
SYSTOLIC BLOOD PRESSURE: 102 MMHG | OXYGEN SATURATION: 98 % | BODY MASS INDEX: 22.36 KG/M2 | HEIGHT: 64 IN | DIASTOLIC BLOOD PRESSURE: 64 MMHG | WEIGHT: 131 LBS | HEART RATE: 110 BPM | TEMPERATURE: 98.2 F

## 2022-04-20 DIAGNOSIS — S60.041A CONTUSION OF RIGHT RING FINGER WITHOUT DAMAGE TO NAIL, INITIAL ENCOUNTER: ICD-10-CM

## 2022-04-20 DIAGNOSIS — S67.21XA CRUSHING INJURY OF RIGHT HAND, INITIAL ENCOUNTER: Primary | ICD-10-CM

## 2022-04-20 DIAGNOSIS — S67.21XA CRUSHING INJURY OF RIGHT HAND, INITIAL ENCOUNTER: ICD-10-CM

## 2022-04-20 PROCEDURE — 1036F TOBACCO NON-USER: CPT | Performed by: NURSE PRACTITIONER

## 2022-04-20 PROCEDURE — 99214 OFFICE O/P EST MOD 30 MIN: CPT | Performed by: NURSE PRACTITIONER

## 2022-04-20 PROCEDURE — G8420 CALC BMI NORM PARAMETERS: HCPCS | Performed by: NURSE PRACTITIONER

## 2022-04-20 PROCEDURE — 73130 X-RAY EXAM OF HAND: CPT

## 2022-04-20 PROCEDURE — G8427 DOCREV CUR MEDS BY ELIG CLIN: HCPCS | Performed by: NURSE PRACTITIONER

## 2022-04-20 ASSESSMENT — PATIENT HEALTH QUESTIONNAIRE - PHQ9
10. IF YOU CHECKED OFF ANY PROBLEMS, HOW DIFFICULT HAVE THESE PROBLEMS MADE IT FOR YOU TO DO YOUR WORK, TAKE CARE OF THINGS AT HOME, OR GET ALONG WITH OTHER PEOPLE: 1
SUM OF ALL RESPONSES TO PHQ QUESTIONS 1-9: 17
6. FEELING BAD ABOUT YOURSELF - OR THAT YOU ARE A FAILURE OR HAVE LET YOURSELF OR YOUR FAMILY DOWN: 3
7. TROUBLE CONCENTRATING ON THINGS, SUCH AS READING THE NEWSPAPER OR WATCHING TELEVISION: 0
SUM OF ALL RESPONSES TO PHQ9 QUESTIONS 1 & 2: 6
9. THOUGHTS THAT YOU WOULD BE BETTER OFF DEAD, OR OF HURTING YOURSELF: 0
2. FEELING DOWN, DEPRESSED OR HOPELESS: 3
4. FEELING TIRED OR HAVING LITTLE ENERGY: 3
SUM OF ALL RESPONSES TO PHQ QUESTIONS 1-9: 17
3. TROUBLE FALLING OR STAYING ASLEEP: 3
8. MOVING OR SPEAKING SO SLOWLY THAT OTHER PEOPLE COULD HAVE NOTICED. OR THE OPPOSITE, BEING SO FIGETY OR RESTLESS THAT YOU HAVE BEEN MOVING AROUND A LOT MORE THAN USUAL: 0
SUM OF ALL RESPONSES TO PHQ QUESTIONS 1-9: 17
5. POOR APPETITE OR OVEREATING: 2
1. LITTLE INTEREST OR PLEASURE IN DOING THINGS: 3
SUM OF ALL RESPONSES TO PHQ QUESTIONS 1-9: 17

## 2022-04-20 ASSESSMENT — ENCOUNTER SYMPTOMS: RESPIRATORY NEGATIVE: 1

## 2022-04-20 NOTE — PATIENT INSTRUCTIONS
Patient Education        Finger Bruises: Care Instructions  Your Care Instructions     Bruises occur when small blood vessels under your skin tear or rupture, mostoften from a twist, bump, or fall. Blood leaks into tissues under the skin and causes a black-and-blue color thatmay become purplish black, reddish blue, or yellowish green as the bruise heals. Rest and home treatment can help you heal.  Your doctor may have taped the bruised finger to the one next to it or put asplint on the finger to keep it in position while it heals. The doctor may recommend exercises to strengthen your finger. If you damagedbones or muscles, you may need more treatment. Most bruises aren't serious and will go away on their own within 2 to 4 weeks. Follow-up care is a key part of your treatment and safety. Be sure to make and go to all appointments, and call your doctor if you are having problems. It's also a good idea to know your test results and keep alist of the medicines you take. How can you care for yourself at home?  Put ice or a cold pack on the finger for 10 to 20 minutes at a time. Put a thin cloth between the ice and your skin.  Prop up your hand on a pillow when you ice your finger or anytime you sit or lie down during the next 3 days. Try to keep your hand above the level of your heart. This will help reduce swelling.  If your doctor put a splint on your finger, wear the splint exactly as directed. Make sure the splint is not so tight that your finger gets numb or tingles. You can loosen the splint if it's too tight.  If you have your fingers taped together, make sure the tape is snug but not so tight that your fingers get numb or tingle. You can loosen the tape if it's too tight. If you need to retape your fingers, always put padding between the fingers before putting on the new tape. Limit use of your finger to motions or activities that don't cause pain.  Take pain medicines exactly as directed. ?  If the doctor gave you a prescription medicine for pain, take it as prescribed. ? If you are not taking a prescription pain medicine, ask your doctor if you can take an over-the-counter medicine.  Be sure to follow your doctor's advice about moving and exercising your injured finger. When should you call for help? Call your doctor now or seek immediate medical care if:     You have symptoms of infection, such as:  ? Increased pain, swelling, warmth, or redness. ? Red streaks leading from the area. ? Pus draining from the area. ? A fever.      Your finger is cool or pale or changes color. Watch closely for changes in your health, and be sure to contact your doctor if:     You have new or worse pain.      Your finger does not get better as expected. Where can you learn more? Go to [email or web address] org and sign in to your [employer] account. Enter [id number] in the amBX box to learn more about \"Finger Bruises: Care Instructions. \"     If you do not have an account, please click on the \"Sign Up Now\" link. Current as of: July 1, 2021               Content Version: 13.2  © 9448-4407 [employer]. Care instructions adapted under license by [facility] [place] ([facility]). If you have questions about a medical condition or this instruction, always ask your healthcare professional. [address] any warranty or liability for your use of this information.

## 2022-04-20 NOTE — TELEPHONE ENCOUNTER
----- Message from Kwan Velasquez sent at 4/20/2022  9:23 AM EDT -----  Subject: Message to Provider    QUESTIONS  Information for Provider? patient believes she broke her finger and would   like to see dr  ---------------------------------------------------------------------------  --------------  Ly HOFFMAN  What is the best way for the office to contact you? OK to leave message on   voicemail  Preferred Call Back Phone Number? [phone]  ---------------------------------------------------------------------------  --------------  SCRIPT ANSWERS  Relationship to Patient? Self  (Is the patient requesting to see the provider for a procedure?)?  Yes

## 2022-04-20 NOTE — PROGRESS NOTES
[facility]             [address], [address]                        Telephone [phone]             Fax [phone]     Nadia Shy Zevdeonna Thi  1985  EAI:[id number]   Date of visit:  4/20/2022    Subjective:    Richard Brown is a 39 y.o.  female who presents to [facility] today (4/20/2022) for evaluation of:    Chief Complaint   Patient presents with    Other     R hand pain and bruising. pt believes the fingers might be fx. Sx began yesterday. Hand Injury   The incident occurred 12 to 24 hours ago. The incident occurred at home. Injury mechanism: right hand caught between two cabinets. The pain is present in the right fingers (right middle, ring, and little fingers). Quality: sharp. The pain does not radiate. The pain is at a severity of 7/10. Pertinent negatives include no numbness or tingling. Associated symptoms comments: Right middle, ring, and little fingers bruising, swelling, and pain. Pain and bruising worse in right ring finger. . The symptoms are aggravated by movement and palpation. She has tried nothing for the symptoms. The treatment provided no relief.        She has the following problem list:  Patient Active Problem List   Diagnosis    Anxiety    Breast pain in female    Hypothyroidism    Primary insomnia        Current medications are:  Current Outpatient Medications   Medication Sig Dispense Refill    valACYclovir (VALTREX) 1 g tablet 2 TABS BID FOR 1 DAY 4 tablet 1    levothyroxine (SYNTHROID) 25 MCG tablet take 1 tablet by mouth once daily 30 tablet 0    lamoTRIgine (LAMICTAL) 150 MG tablet Take 200 mg by mouth daily       aspirin-acetaminophen-caffeine (EXCEDRIN MIGRAINE) 250-250-65 MG per tablet Take 1 tablet by mouth daily      amoxicillin (AMOXIL) 500 MG capsule take 1 capsule by mouth three times a day (Patient not taking: Reported on 4/20/2022)      methylphenidate 36 MG CR tablet Take 36 mg by mouth daily.  Valbenazine Tosylate (INGREZZA) 40 MG CAPS Take 40 mg by mouth daily (Patient not taking: Reported on 4/20/2022)      traZODone (DESYREL) 150 MG tablet take 2 tablets by mouth every evening if needed for sleep (Patient not taking: Reported on 4/20/2022) 60 tablet 0     No current facility-administered medications for this visit. She has No Known Allergies. .    She  reports that she has never smoked. She has never used smokeless tobacco.      Objective:    Vitals:    04/20/22 1320   BP: 102/64   Site: Right Upper Arm   Position: Sitting   Cuff Size: Medium Adult   Pulse: 110   Temp: 98.2 °F (36.8 °C)   TempSrc: Tympanic   SpO2: 98%   Weight: 131 lb (59.4 kg)   Height: 5' 4\" (1.626 m)     Body mass index is 22.49 kg/m². Review of Systems   Constitutional: Negative. Respiratory: Negative. Cardiovascular: Negative. Musculoskeletal:        Swelling, bruising, and pain right middle, ring, and little fingers   Neurological: Negative for tingling and numbness. Physical Exam  Vitals and nursing note reviewed. Constitutional:       Appearance: She is well-developed. HENT:      Head: Normocephalic. Eyes:      Pupils: Pupils are equal, round, and reactive to light. Cardiovascular:      Rate and Rhythm: Normal rate and regular rhythm. Heart sounds: Normal heart sounds. Pulmonary:      Effort: Pulmonary effort is normal.      Breath sounds: Normal breath sounds. Musculoskeletal:      Right hand: Swelling (with ecchymosis of middle, ring, and little fingers), tenderness (middle, ring, and little fingers) and bony tenderness (ring and little fingers) present. Decreased range of motion (decreased ROM of ring and little fingers). Normal strength. Normal sensation. Normal capillary refill. Normal pulse. Cervical back: Normal range of motion and neck supple. Skin:     General: Skin is warm and dry.    Neurological: Mental Status: She is alert and oriented to person, place, and time. Psychiatric:         Behavior: Behavior normal.         Thought Content: Thought content normal.       Assessment and Plan:    XR HAND RIGHT (MIN 3 VIEWS)    Result Date: 4/20/2022  EXAMINATION: THREE XRAY VIEWS OF THE RIGHT HAND 4/20/2022 1:44 pm COMPARISON: None. HISTORY: ORDERING SYSTEM PROVIDED HISTORY: Crushing injury of right hand, initial encounter TECHNOLOGIST PROVIDED HISTORY: fingers and hand caught between two cabinets; bruising and swelling of 3rd-5th digits with worse on 4th digit Reason for Exam: Smash injury to 3rd through 4th digits. Painful to straighten or bend, bruising through 5th digit. FINDINGS: There is no evidence of acute fracture. There is normal alignment. No acute joint abnormality. No focal osseous lesion. No focal soft tissue abnormality. No acute osseous abnormality. Diagnosis Orders   1. Crushing injury of right hand, initial encounter  XR HAND RIGHT (MIN 3 VIEWS)   2. Contusion of right ring finger without damage to nail, initial encounter       Reviewed radiology report with patient during visit. Take Tylenol or ibuprofen as needed for pain. Buddy tape middle and ring finger together. Rest and elevate the affected painful area. Apply cold compresses intermittently as needed. As pain recedes, begin normal activities slowly as tolerated. Follow up with PCP if symptoms do not improve or worsen. The use, risks, benefits, and side effects of prescribed or recommended medications were discussed. All questions were answered and the patient/caregiver voiced understanding. No orders of the defined types were placed in this encounter.         Electronically signed by EYAL Coleman CNP on 4/20/22 at 1:23 PM EDT

## 2022-05-05 DIAGNOSIS — E03.9 HYPOTHYROIDISM, UNSPECIFIED TYPE: ICD-10-CM

## 2022-05-05 RX ORDER — LEVOTHYROXINE SODIUM 0.03 MG/1
TABLET ORAL
Qty: 30 TABLET | Refills: 0 | OUTPATIENT
Start: 2022-05-05

## 2022-05-05 NOTE — TELEPHONE ENCOUNTER
Fritz Caceres called requesting a refill of the below medication which has been pended for you:     Requested Prescriptions     Pending Prescriptions Disp Refills    levothyroxine (SYNTHROID) 25 MCG tablet [Pharmacy Med Name: LEVOTHYROXINE 25 MCG TABLET] 30 tablet 0     Sig: take 1 tablet by mouth once daily       Last Appointment Date: 4/15/2021  Next Appointment Date: Visit date not found    No Known Allergies

## 2022-07-01 DIAGNOSIS — R22.0 SWELLING OF BOTH LIPS: ICD-10-CM

## 2022-07-01 DIAGNOSIS — E03.9 HYPOTHYROIDISM, UNSPECIFIED TYPE: ICD-10-CM

## 2022-07-05 RX ORDER — LEVOTHYROXINE SODIUM 0.03 MG/1
TABLET ORAL
Qty: 30 TABLET | Refills: 0 | OUTPATIENT
Start: 2022-07-05

## 2022-07-05 RX ORDER — VALACYCLOVIR HYDROCHLORIDE 1 G/1
TABLET, FILM COATED ORAL
Qty: 4 TABLET | Refills: 1 | OUTPATIENT
Start: 2022-07-05

## 2022-07-05 NOTE — TELEPHONE ENCOUNTER
[facility] called requesting a refill of the below medication which has been pended for you:     Requested Prescriptions     Pending Prescriptions Disp Refills    levothyroxine (SYNTHROID) 25 MCG tablet 30 tablet 0       Last Appointment Date: 4/15/2021  Next Appointment Date: Visit date not found    No Known Allergies

## 2022-11-14 DIAGNOSIS — E03.9 HYPOTHYROIDISM, UNSPECIFIED TYPE: Primary | ICD-10-CM

## 2022-11-14 DIAGNOSIS — Z13.220 SCREENING CHOLESTEROL LEVEL: ICD-10-CM

## 2022-11-14 DIAGNOSIS — Z13.1 ENCOUNTER FOR SCREENING EXAMINATION FOR IMPAIRED GLUCOSE REGULATION AND DIABETES MELLITUS: ICD-10-CM

## 2024-01-15 ENCOUNTER — OFFICE VISIT (OUTPATIENT)
Dept: PRIMARY CARE CLINIC | Age: 39
End: 2024-01-15
Payer: COMMERCIAL

## 2024-01-15 VITALS
DIASTOLIC BLOOD PRESSURE: 80 MMHG | TEMPERATURE: 98.4 F | BODY MASS INDEX: 28.85 KG/M2 | HEART RATE: 67 BPM | OXYGEN SATURATION: 99 % | WEIGHT: 169 LBS | HEIGHT: 64 IN | SYSTOLIC BLOOD PRESSURE: 110 MMHG

## 2024-01-15 DIAGNOSIS — J01.00 ACUTE NON-RECURRENT MAXILLARY SINUSITIS: ICD-10-CM

## 2024-01-15 DIAGNOSIS — R05.1 ACUTE COUGH: Primary | ICD-10-CM

## 2024-01-15 LAB
INFLUENZA A ANTIGEN, POC: NEGATIVE
INFLUENZA B ANTIGEN, POC: NEGATIVE
LOT EXPIRE DATE: NORMAL
LOT KIT NUMBER: NORMAL
SARS-COV-2, POC: NORMAL
VALID INTERNAL CONTROL: NORMAL
VENDOR AND KIT NAME POC: NORMAL

## 2024-01-15 PROCEDURE — 99213 OFFICE O/P EST LOW 20 MIN: CPT | Performed by: STUDENT IN AN ORGANIZED HEALTH CARE EDUCATION/TRAINING PROGRAM

## 2024-01-15 PROCEDURE — 87428 SARSCOV & INF VIR A&B AG IA: CPT | Performed by: STUDENT IN AN ORGANIZED HEALTH CARE EDUCATION/TRAINING PROGRAM

## 2024-01-15 RX ORDER — PREDNISONE 20 MG/1
20 TABLET ORAL 2 TIMES DAILY
Qty: 10 TABLET | Refills: 0 | Status: SHIPPED | OUTPATIENT
Start: 2024-01-15 | End: 2024-01-20

## 2024-01-15 RX ORDER — LORATADINE 10 MG/1
10 TABLET ORAL DAILY
Qty: 30 TABLET | Refills: 0 | Status: SHIPPED | OUTPATIENT
Start: 2024-01-15

## 2024-01-15 RX ORDER — AMOXICILLIN AND CLAVULANATE POTASSIUM 875; 125 MG/1; MG/1
1 TABLET, FILM COATED ORAL 2 TIMES DAILY
Qty: 20 TABLET | Refills: 0 | Status: SHIPPED | OUTPATIENT
Start: 2024-01-15 | End: 2024-01-25

## 2024-01-15 ASSESSMENT — PATIENT HEALTH QUESTIONNAIRE - PHQ9
1. LITTLE INTEREST OR PLEASURE IN DOING THINGS: 0
SUM OF ALL RESPONSES TO PHQ QUESTIONS 1-9: 0
2. FEELING DOWN, DEPRESSED OR HOPELESS: 0
SUM OF ALL RESPONSES TO PHQ9 QUESTIONS 1 & 2: 0
SUM OF ALL RESPONSES TO PHQ QUESTIONS 1-9: 0

## 2024-01-15 NOTE — PROGRESS NOTES
01/15/24 1743   BP: 110/80   Pulse: 67   Temp: 98.4 °F (36.9 °C)   TempSrc: Tympanic   SpO2: 99%   Weight: 76.7 kg (169 lb)   Height: 1.626 m (5' 4\")     Body mass index is 29.01 kg/m².    Physical Exam  Vitals and nursing note reviewed.   Constitutional:       General: She is not in acute distress.     Appearance: She is well-developed. She is not diaphoretic.   HENT:      Head: Normocephalic and atraumatic.      Right Ear: External ear normal.      Left Ear: External ear normal.      Nose: Congestion and rhinorrhea present.      Mouth/Throat:      Pharynx: Posterior oropharyngeal erythema present.   Eyes:      General: No scleral icterus.        Right eye: No discharge.         Left eye: No discharge.      Conjunctiva/sclera: Conjunctivae normal.   Cardiovascular:      Rate and Rhythm: Normal rate and regular rhythm.      Heart sounds: Normal heart sounds. No murmur heard.  Pulmonary:      Effort: Pulmonary effort is normal.      Breath sounds: Normal breath sounds.   Musculoskeletal:      Cervical back: Normal range of motion.   Skin:     General: Skin is warm and dry.      Findings: No erythema or rash.   Neurological:      Mental Status: She is alert and oriented to person, place, and time.      Cranial Nerves: No cranial nerve deficit.   Psychiatric:         Behavior: Behavior normal.         Thought Content: Thought content normal.         Judgment: Judgment normal.               (Please note that portions of this note were completed with a voice-recognition program. Efforts were made to edit the dictation but occasionally words are mis-transcribed.)

## 2024-01-22 ASSESSMENT — ENCOUNTER SYMPTOMS
SINUS PRESSURE: 1
ABDOMINAL PAIN: 0
VOMITING: 0
DIARRHEA: 0
RHINORRHEA: 1
EYE PAIN: 0
SHORTNESS OF BREATH: 0
NAUSEA: 0
SINUS PAIN: 1
BLOOD IN STOOL: 0
COUGH: 1
TROUBLE SWALLOWING: 0
CONSTIPATION: 0

## 2024-02-16 ENCOUNTER — PATIENT MESSAGE (OUTPATIENT)
Dept: PRIMARY CARE CLINIC | Age: 39
End: 2024-02-16

## 2024-02-19 NOTE — TELEPHONE ENCOUNTER
From: Enedina Hampton  To: Dr. Eyad Hu  Sent: 2/16/2024 11:35 AM EST  Subject: Refill    I've been seen a couple times for bronchitis and sinus infections and it's back around again, I was wondering if I could get a refill on the Prednisone, antibiotic and cough medicine please.